# Patient Record
Sex: MALE | Race: WHITE | Employment: PART TIME | URBAN - METROPOLITAN AREA
[De-identification: names, ages, dates, MRNs, and addresses within clinical notes are randomized per-mention and may not be internally consistent; named-entity substitution may affect disease eponyms.]

---

## 2018-09-18 ENCOUNTER — OFFICE VISIT (OUTPATIENT)
Dept: PULMONOLOGY | Age: 63
End: 2018-09-18
Payer: COMMERCIAL

## 2018-09-18 ENCOUNTER — TELEPHONE (OUTPATIENT)
Dept: PULMONOLOGY | Age: 63
End: 2018-09-18

## 2018-09-18 VITALS
BODY MASS INDEX: 28.63 KG/M2 | RESPIRATION RATE: 16 BRPM | DIASTOLIC BLOOD PRESSURE: 81 MMHG | OXYGEN SATURATION: 94 % | HEART RATE: 57 BPM | TEMPERATURE: 99.9 F | WEIGHT: 216 LBS | HEIGHT: 73 IN | SYSTOLIC BLOOD PRESSURE: 142 MMHG

## 2018-09-18 DIAGNOSIS — R05.9 COUGH: ICD-10-CM

## 2018-09-18 PROCEDURE — 99204 OFFICE O/P NEW MOD 45 MIN: CPT | Performed by: INTERNAL MEDICINE

## 2018-09-18 PROCEDURE — 99203 OFFICE O/P NEW LOW 30 MIN: CPT | Performed by: INTERNAL MEDICINE

## 2018-09-18 RX ORDER — MONTELUKAST SODIUM 10 MG/1
10 TABLET, FILM COATED ORAL NIGHTLY
Qty: 30 TABLET | Refills: 3
Start: 2018-09-18 | End: 2019-01-11 | Stop reason: ALTCHOICE

## 2018-09-18 RX ORDER — AZITHROMYCIN 250 MG/1
250 TABLET, FILM COATED ORAL DAILY
Qty: 7 TABLET | Refills: 0 | Status: SHIPPED | OUTPATIENT
Start: 2018-09-18 | End: 2018-09-25

## 2018-09-18 RX ORDER — BUDESONIDE AND FORMOTEROL FUMARATE DIHYDRATE 160; 4.5 UG/1; UG/1
2 AEROSOL RESPIRATORY (INHALATION) 2 TIMES DAILY
Qty: 1 INHALER | Refills: 5 | Status: SHIPPED | OUTPATIENT
Start: 2018-09-18 | End: 2019-05-21 | Stop reason: ALTCHOICE

## 2018-09-18 RX ORDER — PREDNISONE 20 MG/1
TABLET ORAL
Qty: 7 TABLET | Refills: 0 | Status: SHIPPED | OUTPATIENT
Start: 2018-09-18 | End: 2018-09-28

## 2018-09-18 RX ORDER — ALBUTEROL SULFATE 90 UG/1
2 AEROSOL, METERED RESPIRATORY (INHALATION) EVERY 6 HOURS PRN
Qty: 1 INHALER | Refills: 3 | Status: SHIPPED | OUTPATIENT
Start: 2018-09-18 | End: 2019-09-18

## 2018-09-18 NOTE — PROGRESS NOTES
New pt to see Dr. Lynn Verma today; previously seen by other pulmonologist(Dr. Durga Corea). Persistent coughing. Pt to have CXR and labwork completed and follow up in  6 weeks; appt given. Pt to have Full PFT study and office will arrange and contact pt with appointment date and time.
auscultated. Respiratory: scattered rhonchi    Cardiovascular: Regular without murmur, clicks, gallops or rubs. There is no left or right ventricular heave. Pulses:  Carotid, radial and femoral pulses were equally bilaterally. Abdomen: Slightly rounded and soft without organomegaly. No rebound, rigidity or guarding was appreciated. Lymphatic: No lymphadenopathy. Musculoskeletal: no joint pain. Extremities: no edema  Skin:  Warm and dry. Good color, turgor and pigmentation. No lesions or scars. Neurological/Psychiatric: The patient's general behavior, level of consciousness, thought content and emotional status is normal.  Cranial nerves II-XII are intact. DATA:     Ordered    IMPRESSION:    1-reactive Upper airway disease  2-chronic cough   3-Hoarseness   4-Post nasal draine    5- mild GERD           PLAN:      Shift is very pleasant 24-year-old male who has history of 3 years chronic cough that was treated partially with the Qvar and he did well and he relapsed, there is no hemoptysis or retroflex symptoms except his cough has not been better and he came for further evaluation. To proceed with the following workup  -Eosinophilic count   IgE   Full PFT  Respiratory allergy profile   ESR   Send out HPS panel   CXR   Start azithromycin 250 mg Po daily X 7 days  Start prednisolone 30 mg X 5 days \  Symbicort 160/4.5 2 puff bid   Singulair 10 mg     Flu and Pneumovax     Thank you for allowing me to participate in [facility]. I will keep following with you ,should you have any concerns ,please contact me at [phone]     Sincerely,        Cecily Downs MD  Pulmonary & Critical Care Medicine     NOTE: This report was transcribed using voice recognition software. Every effort was made to ensure accuracy; however, inadvertent computerized transcription errors may be present.

## 2018-09-18 NOTE — TELEPHONE ENCOUNTER
Mailed a letter to patient informing him that his PFT is scheduled for 9-28-18 at 8:00 am at the [facility]. She must arrive at 7:30 am. He is not allowed any resp meds for at least four hour prior to test and no caffeine for 24 hours prior to test

## 2018-09-28 ENCOUNTER — HOSPITAL ENCOUNTER (OUTPATIENT)
Age: 63
Discharge: HOME | End: 2018-09-30
Payer: MEDICARE

## 2018-09-28 ENCOUNTER — HOSPITAL ENCOUNTER (OUTPATIENT)
Dept: GENERAL RADIOLOGY | Age: 63
End: 2018-09-28
Payer: MEDICARE

## 2018-09-28 ENCOUNTER — HOSPITAL ENCOUNTER (OUTPATIENT)
Dept: GENERAL RADIOLOGY | Age: 63
Discharge: HOME | End: 2018-09-30
Payer: MEDICARE

## 2018-09-28 ENCOUNTER — HOSPITAL ENCOUNTER (OUTPATIENT)
Dept: PULMONOLOGY | Age: 63
Discharge: HOME | End: 2018-09-28
Payer: MEDICARE

## 2018-09-28 ENCOUNTER — HOSPITAL ENCOUNTER (OUTPATIENT)
Age: 63
Discharge: HOME | End: 2018-09-28
Payer: MEDICARE

## 2018-09-28 DIAGNOSIS — R05.9 COUGH: ICD-10-CM

## 2018-09-28 LAB
EOSINOPHILS ABSOLUTE COUNT: 280 /UL (ref 50–250)
SEDIMENTATION RATE, ERYTHROCYTE: 3 MM/HR (ref 0–15)

## 2018-09-28 PROCEDURE — 85651 RBC SED RATE NONAUTOMATED: CPT

## 2018-09-28 PROCEDURE — 36415 COLL VENOUS BLD VENIPUNCTURE: CPT

## 2018-09-28 PROCEDURE — 94729 DIFFUSING CAPACITY: CPT

## 2018-09-28 PROCEDURE — 94375 RESPIRATORY FLOW VOLUME LOOP: CPT

## 2018-09-28 PROCEDURE — 85048 AUTOMATED LEUKOCYTE COUNT: CPT

## 2018-09-28 PROCEDURE — 94060 EVALUATION OF WHEEZING: CPT

## 2018-09-28 PROCEDURE — 71046 X-RAY EXAM CHEST 2 VIEWS: CPT

## 2018-09-28 PROCEDURE — 94726 PLETHYSMOGRAPHY LUNG VOLUMES: CPT

## 2018-10-02 PROCEDURE — 94726 PLETHYSMOGRAPHY LUNG VOLUMES: CPT | Performed by: INTERNAL MEDICINE

## 2018-10-02 PROCEDURE — 94060 EVALUATION OF WHEEZING: CPT | Performed by: INTERNAL MEDICINE

## 2018-10-02 PROCEDURE — 94729 DIFFUSING CAPACITY: CPT | Performed by: INTERNAL MEDICINE

## 2018-10-03 NOTE — PROCEDURES
[address]                   [address] [facility],  [facility]                                PULMONARY FUNCTION    PATIENT NAME: Ozzie Luther                        :        1955  MED REC NO:   [id number]                            ROOM:  ACCOUNT NO:   [de-identified]                           ADMIT DATE: 2018  PROVIDER:     Henry Ponce MD    DATE OF PROCEDURE:  2018    FULL PFT    STUDIES:  1. Full PFT with bronchodilator response. 2.  Full lung volume. 3.  Diffusion capacity. VITALS:  Height 73, weight 210, age 70. DIAGNOSIS:  Cough with loss of voice. Never smoked. POSTTEST COMMENTS:  The patient gave good efforts. SPIROMETRY:  FVC 3.72, which is 75 of predicted. FEV1 of 2.90, which is 77 of predicted. There is no bronchodilator response. Actually, his parameters went down  with bronchodilator. FEV1/FVC 78, which is 102 of predicted. MVV 66, which is 45 of predicted. LUNG VOLUMES:  SVC 2.91, which is 56 of predicted. ERV 0.47, which is 30% of predicted. RV 2.99, which is 119% of predicted. TLC is 5.90, which is 77% of predicted. RV/TLC is 51, which is 149 of predicted. Diffusion capacity 28.99, which is 79 of predicted, corrects for alveolar  volume to 110. IMPRESSION:  This PFT is showing evidence of restrictive lung disease. Differential diagnosis include always interstitial lung disease,  parenchymal lung disease, obesity, neuromuscular disease. Also and  combined with the lung volumes, there is evidence of air trapping which  could be suggestive of occult COPD along with normal diffusing capacity. The flow volume loop is compatible with also both obstructive and  restrictive, also extrathoracic obstruction cannot be excluded from the  flow volume loop.     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

## 2018-10-30 ENCOUNTER — HOSPITAL ENCOUNTER (OUTPATIENT)
Age: 63
Discharge: HOME | End: 2018-10-30
Payer: MEDICARE

## 2018-10-30 ENCOUNTER — OFFICE VISIT (OUTPATIENT)
Dept: PULMONOLOGY | Age: 63
End: 2018-10-30
Payer: MEDICARE

## 2018-10-30 VITALS
WEIGHT: 211 LBS | HEART RATE: 86 BPM | BODY MASS INDEX: 27.96 KG/M2 | HEIGHT: 73 IN | OXYGEN SATURATION: 95 % | SYSTOLIC BLOOD PRESSURE: 129 MMHG | RESPIRATION RATE: 16 BRPM | TEMPERATURE: 97.8 F | DIASTOLIC BLOOD PRESSURE: 71 MMHG

## 2018-10-30 DIAGNOSIS — J45.909 REACTIVE AIRWAY DISEASE WITHOUT COMPLICATION, UNSPECIFIED ASTHMA SEVERITY, UNSPECIFIED WHETHER PERSISTENT: ICD-10-CM

## 2018-10-30 PROCEDURE — G8484 FLU IMMUNIZE NO ADMIN: HCPCS | Performed by: INTERNAL MEDICINE

## 2018-10-30 PROCEDURE — 1101F PT FALLS ASSESS-DOCD LE1/YR: CPT | Performed by: INTERNAL MEDICINE

## 2018-10-30 PROCEDURE — 82785 ASSAY OF IGE: CPT

## 2018-10-30 PROCEDURE — G8427 DOCREV CUR MEDS BY ELIG CLIN: HCPCS | Performed by: INTERNAL MEDICINE

## 2018-10-30 PROCEDURE — 99214 OFFICE O/P EST MOD 30 MIN: CPT | Performed by: INTERNAL MEDICINE

## 2018-10-30 PROCEDURE — G8419 CALC BMI OUT NRM PARAM NOF/U: HCPCS | Performed by: INTERNAL MEDICINE

## 2018-10-30 PROCEDURE — 4040F PNEUMOC VAC/ADMIN/RCVD: CPT | Performed by: INTERNAL MEDICINE

## 2018-10-30 PROCEDURE — 1036F TOBACCO NON-USER: CPT | Performed by: INTERNAL MEDICINE

## 2018-10-30 PROCEDURE — 3017F COLORECTAL CA SCREEN DOC REV: CPT | Performed by: INTERNAL MEDICINE

## 2018-10-30 PROCEDURE — 86003 ALLG SPEC IGE CRUDE XTRC EA: CPT

## 2018-10-30 PROCEDURE — 99213 OFFICE O/P EST LOW 20 MIN: CPT | Performed by: INTERNAL MEDICINE

## 2018-10-30 PROCEDURE — 1123F ACP DISCUSS/DSCN MKR DOCD: CPT | Performed by: INTERNAL MEDICINE

## 2018-10-30 NOTE — PROGRESS NOTES
6 week follow up. Still c/o phlem/cough. New orders for Symbicort and Spiriva and pt instructed on use of inhalers by RRT Serge. Plan for pt to get Respiratory Allergen Profile lab work completed here today and Dr. Durham Memos to review results when available. Follow up in 3 mos in office; appt given. CT Scan to be done prior to next appt. Advised pt that MA will notify of time and date of scan.
3  Symbicort 160/4.5 2 puff bid   Singulair 10 mg   We'll proceed with high-resolution CAT scan based on the results we will decide if we need any further workup    I would also consider giving him interleukin  5 antagonist,  Flu and Pneumovax as per primary    Thank you for allowing me to participate in [facility]. I will keep following with you ,should you have any concerns ,please contact me at [phone]     Sincerely,        Nestor Ho MD  Pulmonary & Critical Care Medicine     NOTE: This report was transcribed using voice recognition software. Every effort was made to ensure accuracy; however, inadvertent computerized transcription errors may be present.

## 2018-11-02 LAB
Lab: NORMAL
REPORT: NORMAL
THIS TEST SENT TO: NORMAL

## 2018-11-06 ENCOUNTER — TELEPHONE (OUTPATIENT)
Dept: PULMONOLOGY | Age: 63
End: 2018-11-06

## 2018-11-06 NOTE — TELEPHONE ENCOUNTER
Mailed a letter to patient informing him that his CT Chest High Resolution is scheduled for 1-3-19 at 12:30 pm at the [facility]. He must arrive at 12:00 pm. There is no prep for this test

## 2019-01-03 ENCOUNTER — HOSPITAL ENCOUNTER (OUTPATIENT)
Dept: CT IMAGING | Age: 64
Discharge: HOME | End: 2019-01-05
Payer: MEDICARE

## 2019-01-03 DIAGNOSIS — J45.909 REACTIVE AIRWAY DISEASE WITHOUT COMPLICATION, UNSPECIFIED ASTHMA SEVERITY, UNSPECIFIED WHETHER PERSISTENT: ICD-10-CM

## 2019-01-03 PROCEDURE — 71250 CT THORAX DX C-: CPT

## 2019-01-11 ENCOUNTER — OFFICE VISIT (OUTPATIENT)
Dept: PULMONOLOGY | Age: 64
End: 2019-01-11
Payer: MEDICARE

## 2019-01-11 VITALS
WEIGHT: 226 LBS | TEMPERATURE: 98.4 F | DIASTOLIC BLOOD PRESSURE: 69 MMHG | BODY MASS INDEX: 29.95 KG/M2 | SYSTOLIC BLOOD PRESSURE: 122 MMHG | HEART RATE: 81 BPM | RESPIRATION RATE: 20 BRPM | HEIGHT: 73 IN

## 2019-01-11 DIAGNOSIS — J45.991 COUGH VARIANT ASTHMA: Primary | ICD-10-CM

## 2019-01-11 DIAGNOSIS — J45.40 MODERATE PERSISTENT REACTIVE AIRWAY DISEASE WITHOUT COMPLICATION: ICD-10-CM

## 2019-01-11 PROCEDURE — 99213 OFFICE O/P EST LOW 20 MIN: CPT | Performed by: INTERNAL MEDICINE

## 2019-01-11 PROCEDURE — 99214 OFFICE O/P EST MOD 30 MIN: CPT | Performed by: INTERNAL MEDICINE

## 2019-01-16 ENCOUNTER — TELEPHONE (OUTPATIENT)
Dept: PULMONOLOGY | Age: 64
End: 2019-01-16

## 2019-01-16 DIAGNOSIS — J82.83 EOSINOPHILIC ASTHMA: Primary | ICD-10-CM

## 2019-01-16 PROBLEM — J45.909 MODERATE ASTHMA: Status: ACTIVE | Noted: 2019-01-11

## 2019-01-22 RX ORDER — EPINEPHRINE 1 MG/ML
0.3 INJECTION, SOLUTION, CONCENTRATE INTRAVENOUS PRN
Status: CANCELLED | OUTPATIENT
Start: 2019-01-22

## 2019-01-22 RX ORDER — SODIUM CHLORIDE 0.9 % (FLUSH) 0.9 %
10 SYRINGE (ML) INJECTION PRN
Status: CANCELLED | OUTPATIENT
Start: 2019-01-22

## 2019-01-22 RX ORDER — METHYLPREDNISOLONE SODIUM SUCCINATE 125 MG/2ML
125 INJECTION, POWDER, LYOPHILIZED, FOR SOLUTION INTRAMUSCULAR; INTRAVENOUS ONCE
Status: CANCELLED | OUTPATIENT
Start: 2019-01-22 | End: 2019-01-22

## 2019-01-22 RX ORDER — 0.9 % SODIUM CHLORIDE 0.9 %
10 VIAL (ML) INJECTION ONCE
Status: CANCELLED | OUTPATIENT
Start: 2019-01-22 | End: 2019-01-22

## 2019-01-22 RX ORDER — DIPHENHYDRAMINE HYDROCHLORIDE 50 MG/ML
50 INJECTION INTRAMUSCULAR; INTRAVENOUS ONCE
Status: CANCELLED | OUTPATIENT
Start: 2019-01-22 | End: 2019-01-22

## 2019-01-22 RX ORDER — SODIUM CHLORIDE 9 MG/ML
INJECTION, SOLUTION INTRAVENOUS CONTINUOUS
Status: CANCELLED | OUTPATIENT
Start: 2019-01-22

## 2019-01-22 RX ORDER — SODIUM CHLORIDE 0.9 % (FLUSH) 0.9 %
5 SYRINGE (ML) INJECTION PRN
Status: CANCELLED | OUTPATIENT
Start: 2019-01-22

## 2019-01-22 RX ORDER — ACETAMINOPHEN 325 MG/1
650 TABLET ORAL ONCE
Status: CANCELLED | OUTPATIENT
Start: 2019-01-22

## 2019-01-22 RX ORDER — HEPARIN SODIUM (PORCINE) LOCK FLUSH IV SOLN 100 UNIT/ML 100 UNIT/ML
500 SOLUTION INTRAVENOUS PRN
Status: CANCELLED | OUTPATIENT
Start: 2019-01-22

## 2019-02-04 DIAGNOSIS — J45.40 MODERATE PERSISTENT ASTHMA, UNSPECIFIED WHETHER COMPLICATED: Primary | ICD-10-CM

## 2019-02-04 RX ORDER — EPINEPHRINE 0.3 MG/.3ML
INJECTION SUBCUTANEOUS
Qty: 0.3 ML | Refills: 0 | Status: SHIPPED | OUTPATIENT
Start: 2019-02-04 | End: 2019-07-01 | Stop reason: SDUPTHER

## 2019-02-09 ENCOUNTER — PATIENT MESSAGE (OUTPATIENT)
Dept: PULMONOLOGY | Age: 64
End: 2019-02-09

## 2019-02-12 ENCOUNTER — HOSPITAL ENCOUNTER (OUTPATIENT)
Dept: INFUSION THERAPY | Age: 64
Setting detail: INFUSION SERIES
Discharge: HOME | End: 2019-02-12
Payer: MEDICARE

## 2019-02-12 VITALS
RESPIRATION RATE: 18 BRPM | SYSTOLIC BLOOD PRESSURE: 125 MMHG | OXYGEN SATURATION: 95 % | TEMPERATURE: 97.7 F | HEART RATE: 52 BPM | DIASTOLIC BLOOD PRESSURE: 72 MMHG

## 2019-02-12 DIAGNOSIS — J82.83 EOSINOPHILIC ASTHMA: ICD-10-CM

## 2019-02-12 PROCEDURE — 96372 THER/PROPH/DIAG INJ SC/IM: CPT

## 2019-02-12 PROCEDURE — 6370000000 HC RX 637 (ALT 250 FOR IP): Performed by: INTERNAL MEDICINE

## 2019-02-12 PROCEDURE — 2580000003 HC RX 258: Performed by: INTERNAL MEDICINE

## 2019-02-12 PROCEDURE — 6360000002 HC RX W HCPCS: Performed by: INTERNAL MEDICINE

## 2019-02-12 RX ORDER — ACETAMINOPHEN 325 MG/1
650 TABLET ORAL ONCE
Status: COMPLETED | OUTPATIENT
Start: 2019-02-12 | End: 2019-02-12

## 2019-02-12 RX ORDER — SODIUM CHLORIDE 9 MG/ML
INJECTION, SOLUTION INTRAVENOUS CONTINUOUS
Status: CANCELLED | OUTPATIENT
Start: 2019-02-12

## 2019-02-12 RX ORDER — SODIUM CHLORIDE 0.9 % (FLUSH) 0.9 %
10 SYRINGE (ML) INJECTION PRN
Status: CANCELLED | OUTPATIENT
Start: 2019-02-12

## 2019-02-12 RX ORDER — METHYLPREDNISOLONE SODIUM SUCCINATE 125 MG/2ML
125 INJECTION, POWDER, LYOPHILIZED, FOR SOLUTION INTRAMUSCULAR; INTRAVENOUS ONCE
Status: CANCELLED | OUTPATIENT
Start: 2019-02-12 | End: 2019-02-12

## 2019-02-12 RX ORDER — HEPARIN SODIUM (PORCINE) LOCK FLUSH IV SOLN 100 UNIT/ML 100 UNIT/ML
500 SOLUTION INTRAVENOUS PRN
Status: CANCELLED | OUTPATIENT
Start: 2019-02-12

## 2019-02-12 RX ORDER — EPINEPHRINE 1 MG/ML
0.3 INJECTION, SOLUTION, CONCENTRATE INTRAVENOUS PRN
Status: CANCELLED | OUTPATIENT
Start: 2019-02-12

## 2019-02-12 RX ORDER — ACETAMINOPHEN 325 MG/1
650 TABLET ORAL ONCE
Status: CANCELLED | OUTPATIENT
Start: 2019-02-12

## 2019-02-12 RX ORDER — DIPHENHYDRAMINE HYDROCHLORIDE 50 MG/ML
50 INJECTION INTRAMUSCULAR; INTRAVENOUS ONCE
Status: CANCELLED | OUTPATIENT
Start: 2019-02-12 | End: 2019-02-12

## 2019-02-12 RX ORDER — SODIUM CHLORIDE 0.9 % (FLUSH) 0.9 %
5 SYRINGE (ML) INJECTION PRN
Status: CANCELLED | OUTPATIENT
Start: 2019-02-12

## 2019-02-12 RX ORDER — 0.9 % SODIUM CHLORIDE 0.9 %
10 VIAL (ML) INJECTION ONCE
Status: CANCELLED | OUTPATIENT
Start: 2019-02-12 | End: 2019-02-12

## 2019-02-12 RX ADMIN — ACETAMINOPHEN 650 MG: 325 TABLET, FILM COATED ORAL at 09:35

## 2019-02-12 RX ADMIN — WATER 225 MG: 1 INJECTION INTRAMUSCULAR; INTRAVENOUS; SUBCUTANEOUS at 09:40

## 2019-02-26 ENCOUNTER — HOSPITAL ENCOUNTER (OUTPATIENT)
Dept: INFUSION THERAPY | Age: 64
Setting detail: INFUSION SERIES
Discharge: HOME | End: 2019-02-26
Payer: MEDICARE

## 2019-02-26 VITALS
RESPIRATION RATE: 18 BRPM | HEART RATE: 64 BPM | DIASTOLIC BLOOD PRESSURE: 84 MMHG | OXYGEN SATURATION: 96 % | TEMPERATURE: 97.4 F | SYSTOLIC BLOOD PRESSURE: 124 MMHG

## 2019-02-26 DIAGNOSIS — J82.83 EOSINOPHILIC ASTHMA: ICD-10-CM

## 2019-02-26 PROCEDURE — 96372 THER/PROPH/DIAG INJ SC/IM: CPT

## 2019-02-26 PROCEDURE — 6360000002 HC RX W HCPCS: Performed by: INTERNAL MEDICINE

## 2019-02-26 PROCEDURE — 2580000003 HC RX 258: Performed by: INTERNAL MEDICINE

## 2019-02-26 PROCEDURE — 6370000000 HC RX 637 (ALT 250 FOR IP): Performed by: INTERNAL MEDICINE

## 2019-02-26 RX ORDER — DIPHENHYDRAMINE HYDROCHLORIDE 50 MG/ML
50 INJECTION INTRAMUSCULAR; INTRAVENOUS ONCE
Status: CANCELLED | OUTPATIENT
Start: 2019-02-26 | End: 2019-02-26

## 2019-02-26 RX ORDER — METHYLPREDNISOLONE SODIUM SUCCINATE 125 MG/2ML
125 INJECTION, POWDER, LYOPHILIZED, FOR SOLUTION INTRAMUSCULAR; INTRAVENOUS ONCE
Status: CANCELLED | OUTPATIENT
Start: 2019-02-26 | End: 2019-02-26

## 2019-02-26 RX ORDER — ACETAMINOPHEN 325 MG/1
650 TABLET ORAL ONCE
Status: COMPLETED | OUTPATIENT
Start: 2019-02-26 | End: 2019-02-26

## 2019-02-26 RX ORDER — EPINEPHRINE 1 MG/ML
0.3 INJECTION, SOLUTION, CONCENTRATE INTRAVENOUS PRN
Status: CANCELLED | OUTPATIENT
Start: 2019-02-26

## 2019-02-26 RX ORDER — SODIUM CHLORIDE 0.9 % (FLUSH) 0.9 %
10 SYRINGE (ML) INJECTION PRN
Status: CANCELLED | OUTPATIENT
Start: 2019-02-26

## 2019-02-26 RX ORDER — SODIUM CHLORIDE 0.9 % (FLUSH) 0.9 %
5 SYRINGE (ML) INJECTION PRN
Status: CANCELLED | OUTPATIENT
Start: 2019-02-26

## 2019-02-26 RX ORDER — HEPARIN SODIUM (PORCINE) LOCK FLUSH IV SOLN 100 UNIT/ML 100 UNIT/ML
500 SOLUTION INTRAVENOUS PRN
Status: CANCELLED | OUTPATIENT
Start: 2019-02-26

## 2019-02-26 RX ORDER — SODIUM CHLORIDE 9 MG/ML
INJECTION, SOLUTION INTRAVENOUS CONTINUOUS
Status: CANCELLED | OUTPATIENT
Start: 2019-02-26

## 2019-02-26 RX ORDER — 0.9 % SODIUM CHLORIDE 0.9 %
10 VIAL (ML) INJECTION ONCE
Status: CANCELLED | OUTPATIENT
Start: 2019-02-26 | End: 2019-02-26

## 2019-02-26 RX ORDER — ACETAMINOPHEN 325 MG/1
650 TABLET ORAL ONCE
Status: CANCELLED | OUTPATIENT
Start: 2019-02-26

## 2019-02-26 RX ADMIN — WATER 225 MG: 1 INJECTION INTRAMUSCULAR; INTRAVENOUS; SUBCUTANEOUS at 09:27

## 2019-02-26 RX ADMIN — ACETAMINOPHEN 650 MG: 325 TABLET, FILM COATED ORAL at 09:01

## 2019-02-26 NOTE — PROGRESS NOTES
[address] [place] [address] Allergy Control Test    Prescribing Physician: Dc Ambrocioor    Was patient administered Xolair on appointed administration date? [x] yes [] no    Reason for not administering Xolair :[] Illness [] No show [] Other:    Was there any reaction to mediation? [] yes [] no    If Yes: Reactions:      1. In the past 4 weeks, how much of the time did your asthma keep you from getting as much done as usual at work, school or at home? [] 1 [] 2 [] 3 [] 4 [x] 5   Score:__5____    2. During the past 4 weeks, how often have you had shortness of breath? [] 1 [] 2 [] 3 [] 4 [x] 5   Score:__5____    3. During the past four weeks, how often did your asthma symptoms (wheezing, coughing, shortness of breath, chest tightness, or pain) wake you up at night or earlier than usual in the morning? [] 1 [x] 2 [] 3 [] 4 [] 5   Score:___2___    4. During the past four weeks, how often have you used your rescue inhaler or nebulizer medication? [x] 1 [] 2 [] 3 [] 4 [] 5   Score:__1____    5. How would you rate your asthma control during the past 4 weeks? [] 1 [] 2 [x] 3 [] 4 [] 5   Score:__3____        Total Score:_______16_________        To score the Asthma control test: Each response to the 5 ACT questions has a point value from 1-5 as shown on the form. To score the ACT, add up the point value for each response to the 5 questions.

## 2019-03-12 ENCOUNTER — HOSPITAL ENCOUNTER (OUTPATIENT)
Dept: INFUSION THERAPY | Age: 64
Setting detail: INFUSION SERIES
Discharge: HOME | End: 2019-03-12
Payer: MEDICARE

## 2019-03-12 ENCOUNTER — TELEPHONE (OUTPATIENT)
Dept: PULMONOLOGY | Age: 64
End: 2019-03-12

## 2019-03-12 VITALS
HEART RATE: 68 BPM | SYSTOLIC BLOOD PRESSURE: 129 MMHG | TEMPERATURE: 98.1 F | RESPIRATION RATE: 16 BRPM | OXYGEN SATURATION: 97 % | DIASTOLIC BLOOD PRESSURE: 81 MMHG

## 2019-03-12 DIAGNOSIS — J82.83 EOSINOPHILIC ASTHMA: Primary | ICD-10-CM

## 2019-03-12 PROCEDURE — 6360000002 HC RX W HCPCS: Performed by: INTERNAL MEDICINE

## 2019-03-12 PROCEDURE — 6370000000 HC RX 637 (ALT 250 FOR IP): Performed by: INTERNAL MEDICINE

## 2019-03-12 PROCEDURE — 2580000003 HC RX 258: Performed by: INTERNAL MEDICINE

## 2019-03-12 PROCEDURE — 96372 THER/PROPH/DIAG INJ SC/IM: CPT

## 2019-03-12 RX ORDER — ACETAMINOPHEN 325 MG/1
650 TABLET ORAL ONCE
Status: COMPLETED | OUTPATIENT
Start: 2019-03-12 | End: 2019-03-12

## 2019-03-12 RX ORDER — ACETAMINOPHEN 325 MG/1
650 TABLET ORAL ONCE
Status: CANCELLED | OUTPATIENT
Start: 2019-03-12

## 2019-03-12 RX ORDER — SODIUM CHLORIDE 0.9 % (FLUSH) 0.9 %
10 SYRINGE (ML) INJECTION PRN
Status: CANCELLED | OUTPATIENT
Start: 2019-03-12

## 2019-03-12 RX ORDER — EPINEPHRINE 1 MG/ML
0.3 INJECTION, SOLUTION, CONCENTRATE INTRAVENOUS PRN
Status: CANCELLED | OUTPATIENT
Start: 2019-03-12

## 2019-03-12 RX ORDER — DIPHENHYDRAMINE HYDROCHLORIDE 50 MG/ML
50 INJECTION INTRAMUSCULAR; INTRAVENOUS ONCE
Status: CANCELLED | OUTPATIENT
Start: 2019-03-12 | End: 2019-03-12

## 2019-03-12 RX ORDER — SODIUM CHLORIDE 0.9 % (FLUSH) 0.9 %
5 SYRINGE (ML) INJECTION PRN
Status: CANCELLED | OUTPATIENT
Start: 2019-03-12

## 2019-03-12 RX ORDER — 0.9 % SODIUM CHLORIDE 0.9 %
10 VIAL (ML) INJECTION ONCE
Status: CANCELLED | OUTPATIENT
Start: 2019-03-12 | End: 2019-03-12

## 2019-03-12 RX ORDER — HEPARIN SODIUM (PORCINE) LOCK FLUSH IV SOLN 100 UNIT/ML 100 UNIT/ML
500 SOLUTION INTRAVENOUS PRN
Status: CANCELLED | OUTPATIENT
Start: 2019-03-12

## 2019-03-12 RX ORDER — SODIUM CHLORIDE 9 MG/ML
INJECTION, SOLUTION INTRAVENOUS CONTINUOUS
Status: CANCELLED | OUTPATIENT
Start: 2019-03-12

## 2019-03-12 RX ORDER — METHYLPREDNISOLONE SODIUM SUCCINATE 125 MG/2ML
125 INJECTION, POWDER, LYOPHILIZED, FOR SOLUTION INTRAMUSCULAR; INTRAVENOUS ONCE
Status: CANCELLED | OUTPATIENT
Start: 2019-03-12 | End: 2019-03-12

## 2019-03-12 RX ADMIN — WATER 225 MG: 1 INJECTION INTRAMUSCULAR; INTRAVENOUS; SUBCUTANEOUS at 10:06

## 2019-03-12 RX ADMIN — ACETAMINOPHEN 650 MG: 325 TABLET, FILM COATED ORAL at 10:00

## 2019-03-12 NOTE — PROGRESS NOTES
Patient unable to stay for the full 2 hour observation. Only able to stay for 90 minutes. All vital signs are good and oxygen saturation is also good. Patient declined discharge instructions. Patient discharged in stable condition.

## 2019-03-12 NOTE — PROGRESS NOTES
[facility] Xolair Allergy Control Test    Prescribing Physician:    Was patient administered Xolair on appointed administration date? [x] yes [] no    Reason for not administering Xolair :[] Illness [] No show [] Other:    Was there any reaction to mediation? [] yes [x] no    If Yes: Reactions:      1. In the past 4 weeks, how much of the time did your asthma keep you from getting as much done as usual at work, school or at home? [] 1 [] 2 [] 3 [] 4 [x] 5   Score:___5___    2. During the past 4 weeks, how often have you had shortness of breath? [] 1 [] 2 [] 3 [] 4 [x] 5   Score:____5__    3. During the past four weeks, how often did your asthma symptoms (wheezing, coughing, shortness of breath, chest tightness, or pain) wake you up at night or earlier than usual in the morning? [] 1 [x] 2 [] 3 [] 4 [] 5   Score:___2___    4. During the past four weeks, how often have you used your rescue inhaler or nebulizer medication? [] 1 [] 2 [] 3 [] 4 [x] 5   Score:__5____    5. How would you rate your asthma control during the past 4 weeks? [] 1 [] 2 [x] 3 [] 4 [] 5   Score:___3___        Total Score:_______20_________        To score the Asthma control test: Each response to the 5 ACT questions has a point value from 1-5 as shown on the form. To score the ACT, add up the point value for each response to the 5 questions.

## 2019-03-15 ENCOUNTER — TELEPHONE (OUTPATIENT)
Dept: PULMONOLOGY | Age: 64
End: 2019-03-15

## 2019-03-26 ENCOUNTER — OFFICE VISIT (OUTPATIENT)
Dept: PULMONOLOGY | Age: 64
End: 2019-03-26
Payer: MEDICARE

## 2019-03-26 ENCOUNTER — HOSPITAL ENCOUNTER (OUTPATIENT)
Dept: INFUSION THERAPY | Age: 64
Setting detail: INFUSION SERIES
Discharge: HOME | End: 2019-03-26
Payer: MEDICARE

## 2019-03-26 VITALS
SYSTOLIC BLOOD PRESSURE: 137 MMHG | OXYGEN SATURATION: 96 % | DIASTOLIC BLOOD PRESSURE: 81 MMHG | HEIGHT: 73 IN | WEIGHT: 224 LBS | BODY MASS INDEX: 29.69 KG/M2 | HEART RATE: 54 BPM | RESPIRATION RATE: 18 BRPM

## 2019-03-26 VITALS
DIASTOLIC BLOOD PRESSURE: 69 MMHG | RESPIRATION RATE: 18 BRPM | OXYGEN SATURATION: 99 % | SYSTOLIC BLOOD PRESSURE: 121 MMHG | TEMPERATURE: 97.3 F | HEART RATE: 59 BPM

## 2019-03-26 DIAGNOSIS — J45.40 MODERATE PERSISTENT ASTHMA, UNSPECIFIED WHETHER COMPLICATED: ICD-10-CM

## 2019-03-26 DIAGNOSIS — J82.83 EOSINOPHILIC ASTHMA: Primary | ICD-10-CM

## 2019-03-26 DIAGNOSIS — J82.83 EOSINOPHILIC ASTHMA: ICD-10-CM

## 2019-03-26 PROCEDURE — 96372 THER/PROPH/DIAG INJ SC/IM: CPT

## 2019-03-26 PROCEDURE — 6370000000 HC RX 637 (ALT 250 FOR IP): Performed by: INTERNAL MEDICINE

## 2019-03-26 PROCEDURE — 99213 OFFICE O/P EST LOW 20 MIN: CPT | Performed by: INTERNAL MEDICINE

## 2019-03-26 PROCEDURE — 99214 OFFICE O/P EST MOD 30 MIN: CPT | Performed by: INTERNAL MEDICINE

## 2019-03-26 PROCEDURE — 2580000003 HC RX 258: Performed by: INTERNAL MEDICINE

## 2019-03-26 PROCEDURE — 6360000002 HC RX W HCPCS: Performed by: INTERNAL MEDICINE

## 2019-03-26 RX ORDER — ACETAMINOPHEN 325 MG/1
650 TABLET ORAL ONCE
Status: COMPLETED | OUTPATIENT
Start: 2019-03-26 | End: 2019-03-26

## 2019-03-26 RX ORDER — SODIUM CHLORIDE 0.9 % (FLUSH) 0.9 %
5 SYRINGE (ML) INJECTION PRN
Status: CANCELLED | OUTPATIENT
Start: 2019-03-26

## 2019-03-26 RX ORDER — DIPHENHYDRAMINE HYDROCHLORIDE 50 MG/ML
50 INJECTION INTRAMUSCULAR; INTRAVENOUS ONCE
Status: CANCELLED | OUTPATIENT
Start: 2019-03-26 | End: 2019-03-26

## 2019-03-26 RX ORDER — HEPARIN SODIUM (PORCINE) LOCK FLUSH IV SOLN 100 UNIT/ML 100 UNIT/ML
500 SOLUTION INTRAVENOUS PRN
Status: CANCELLED | OUTPATIENT
Start: 2019-03-26

## 2019-03-26 RX ORDER — SODIUM CHLORIDE 0.9 % (FLUSH) 0.9 %
10 SYRINGE (ML) INJECTION PRN
Status: CANCELLED | OUTPATIENT
Start: 2019-03-26

## 2019-03-26 RX ORDER — SODIUM CHLORIDE 9 MG/ML
INJECTION, SOLUTION INTRAVENOUS CONTINUOUS
Status: CANCELLED | OUTPATIENT
Start: 2019-03-26

## 2019-03-26 RX ORDER — BUDESONIDE AND FORMOTEROL FUMARATE DIHYDRATE 160; 4.5 UG/1; UG/1
2 AEROSOL RESPIRATORY (INHALATION) 2 TIMES DAILY
Qty: 1 INHALER | Refills: 5 | Status: SHIPPED | OUTPATIENT
Start: 2019-03-26 | End: 2019-04-09 | Stop reason: SINTOL

## 2019-03-26 RX ORDER — ACETAMINOPHEN 325 MG/1
650 TABLET ORAL ONCE
Status: CANCELLED | OUTPATIENT
Start: 2019-03-26

## 2019-03-26 RX ORDER — METHYLPREDNISOLONE SODIUM SUCCINATE 125 MG/2ML
125 INJECTION, POWDER, LYOPHILIZED, FOR SOLUTION INTRAMUSCULAR; INTRAVENOUS ONCE
Status: CANCELLED | OUTPATIENT
Start: 2019-03-26 | End: 2019-03-26

## 2019-03-26 RX ORDER — 0.9 % SODIUM CHLORIDE 0.9 %
10 VIAL (ML) INJECTION ONCE
Status: CANCELLED | OUTPATIENT
Start: 2019-03-26 | End: 2019-03-26

## 2019-03-26 RX ORDER — EPINEPHRINE 1 MG/ML
0.3 INJECTION, SOLUTION, CONCENTRATE INTRAVENOUS PRN
Status: CANCELLED | OUTPATIENT
Start: 2019-03-26

## 2019-03-26 RX ADMIN — ACETAMINOPHEN 650 MG: 325 TABLET, FILM COATED ORAL at 13:44

## 2019-03-26 RX ADMIN — WATER 225 MG: 1 INJECTION INTRAMUSCULAR; INTRAVENOUS; SUBCUTANEOUS at 13:49

## 2019-03-26 NOTE — PROGRESS NOTES
[facility] Xolair Allergy Control Test    Prescribing Physician: Brandin Moreno    Was patient administered Xolair on appointed administration date? [x] yes [] no    Reason for not administering Xolair :[] Illness [] No show [] Other:    Was there any reaction to mediation? [] yes [x] no    If Yes: Reactions:      1. In the past 4 weeks, how much of the time did your asthma keep you from getting as much done as usual at work, school or at home? [] 1 [] 2 [] 3 [] 4 [x] 5   Score:_5_____    2. During the past 4 weeks, how often have you had shortness of breath? [] 1 [] 2 [] 3 [] 4 [x] 5   Score:__5____    3. During the past four weeks, how often did your asthma symptoms (wheezing, coughing, shortness of breath, chest tightness, or pain) wake you up at night or earlier than usual in the morning? [] 1 [x] 2 [] 3 [] 4 [] 5   Score:__2____    4. During the past four weeks, how often have you used your rescue inhaler or nebulizer medication? [] 1 [] 2 [] 3 [] 4 [x] 5   Score:__5____    5. How would you rate your asthma control during the past 4 weeks? [] 1 [] 2 [x] 3 [] 4 [] 5   Score:__3____        Total Score:______20__________        To score the Asthma control test: Each response to the 5 ACT questions has a point value from 1-5 as shown on the form. To score the ACT, add up the point value for each response to the 5 questions.

## 2019-03-26 NOTE — PROGRESS NOTES
Patient came in for 4th xolair injection. Patient states he has an appointment with his pulmonologist Dr. Hailey Abbott at 2:30 and cannot stay full hour after injection. Attempted to reach Dr. Jaya Alonso's office to see if it was okay for patient to finish last half hour with them, but was unable to get ahold of anyone. Patient states that he did not want to miss his appointment so he was discharged and walked to Dr. Jaya Alonso's office down the pineda. Patient discharged in stable condition.

## 2019-04-09 ENCOUNTER — TELEPHONE (OUTPATIENT)
Dept: PULMONOLOGY | Age: 64
End: 2019-04-09

## 2019-04-09 ENCOUNTER — HOSPITAL ENCOUNTER (OUTPATIENT)
Dept: INFUSION THERAPY | Age: 64
Setting detail: INFUSION SERIES
Discharge: HOME | End: 2019-04-09
Payer: MEDICARE

## 2019-04-09 VITALS
DIASTOLIC BLOOD PRESSURE: 70 MMHG | OXYGEN SATURATION: 97 % | SYSTOLIC BLOOD PRESSURE: 119 MMHG | RESPIRATION RATE: 18 BRPM | TEMPERATURE: 97.6 F | HEART RATE: 57 BPM

## 2019-04-09 DIAGNOSIS — J45.909 REACTIVE AIRWAY DISEASE WITHOUT COMPLICATION, UNSPECIFIED ASTHMA SEVERITY, UNSPECIFIED WHETHER PERSISTENT: Primary | ICD-10-CM

## 2019-04-09 DIAGNOSIS — J82.83 EOSINOPHILIC ASTHMA: Primary | ICD-10-CM

## 2019-04-09 PROCEDURE — 6370000000 HC RX 637 (ALT 250 FOR IP): Performed by: INTERNAL MEDICINE

## 2019-04-09 PROCEDURE — 2580000003 HC RX 258: Performed by: INTERNAL MEDICINE

## 2019-04-09 PROCEDURE — 6360000002 HC RX W HCPCS: Performed by: INTERNAL MEDICINE

## 2019-04-09 PROCEDURE — 96372 THER/PROPH/DIAG INJ SC/IM: CPT

## 2019-04-09 RX ORDER — SODIUM CHLORIDE 0.9 % (FLUSH) 0.9 %
10 SYRINGE (ML) INJECTION PRN
Status: CANCELLED | OUTPATIENT
Start: 2019-04-23

## 2019-04-09 RX ORDER — FLUTICASONE FUROATE AND VILANTEROL 100; 25 UG/1; UG/1
1 POWDER RESPIRATORY (INHALATION) DAILY
Qty: 1 EACH | Refills: 0 | COMMUNITY
Start: 2019-04-09 | End: 2019-04-22 | Stop reason: SDUPTHER

## 2019-04-09 RX ORDER — METHYLPREDNISOLONE SODIUM SUCCINATE 125 MG/2ML
125 INJECTION, POWDER, LYOPHILIZED, FOR SOLUTION INTRAMUSCULAR; INTRAVENOUS ONCE
Status: CANCELLED | OUTPATIENT
Start: 2019-04-23

## 2019-04-09 RX ORDER — SODIUM CHLORIDE 0.9 % (FLUSH) 0.9 %
5 SYRINGE (ML) INJECTION PRN
Status: CANCELLED | OUTPATIENT
Start: 2019-04-23

## 2019-04-09 RX ORDER — SODIUM CHLORIDE 9 MG/ML
INJECTION, SOLUTION INTRAVENOUS CONTINUOUS
Status: CANCELLED | OUTPATIENT
Start: 2019-04-23

## 2019-04-09 RX ORDER — DIPHENHYDRAMINE HYDROCHLORIDE 50 MG/ML
50 INJECTION INTRAMUSCULAR; INTRAVENOUS ONCE
Status: CANCELLED | OUTPATIENT
Start: 2019-04-23

## 2019-04-09 RX ORDER — ACETAMINOPHEN 325 MG/1
650 TABLET ORAL ONCE
Status: CANCELLED | OUTPATIENT
Start: 2019-04-23

## 2019-04-09 RX ORDER — 0.9 % SODIUM CHLORIDE 0.9 %
10 VIAL (ML) INJECTION ONCE
Status: CANCELLED | OUTPATIENT
Start: 2019-04-23

## 2019-04-09 RX ORDER — HEPARIN SODIUM (PORCINE) LOCK FLUSH IV SOLN 100 UNIT/ML 100 UNIT/ML
500 SOLUTION INTRAVENOUS PRN
Status: CANCELLED | OUTPATIENT
Start: 2019-04-23

## 2019-04-09 RX ORDER — ACETAMINOPHEN 325 MG/1
650 TABLET ORAL ONCE
Status: COMPLETED | OUTPATIENT
Start: 2019-04-09 | End: 2019-04-09

## 2019-04-09 RX ORDER — EPINEPHRINE 1 MG/ML
0.3 INJECTION, SOLUTION, CONCENTRATE INTRAVENOUS PRN
Status: CANCELLED | OUTPATIENT
Start: 2019-04-23

## 2019-04-09 RX ADMIN — WATER 225 MG: 1 INJECTION INTRAMUSCULAR; INTRAVENOUS; SUBCUTANEOUS at 09:33

## 2019-04-09 RX ADMIN — ACETAMINOPHEN 650 MG: 325 TABLET, FILM COATED ORAL at 09:21

## 2019-04-09 NOTE — TELEPHONE ENCOUNTER
Patient stopped in office today with complaints of hoarseness and increased congestion in chest. Formulary change to Breo 100 mcg inhaler. Samples for 1 month given to pt and pt to call RN to update on symptoms after 1 week of use. Pt to call next week and update Dr. Alannah García on effectiveness of new med.

## 2019-04-22 DIAGNOSIS — J45.909 REACTIVE AIRWAY DISEASE WITHOUT COMPLICATION, UNSPECIFIED ASTHMA SEVERITY, UNSPECIFIED WHETHER PERSISTENT: ICD-10-CM

## 2019-04-22 RX ORDER — FLUTICASONE FUROATE AND VILANTEROL 100; 25 UG/1; UG/1
1 POWDER RESPIRATORY (INHALATION) DAILY
Qty: 1 EACH | Refills: 5 | Status: SHIPPED | OUTPATIENT
Start: 2019-04-22 | End: 2019-05-21 | Stop reason: SDUPTHER

## 2019-04-22 RX ORDER — FLUTICASONE FUROATE AND VILANTEROL 100; 25 UG/1; UG/1
1 POWDER RESPIRATORY (INHALATION) DAILY
Qty: 1 EACH | Refills: 0 | Status: SHIPPED | OUTPATIENT
Start: 2019-04-22 | End: 2019-04-22 | Stop reason: SDUPTHER

## 2019-04-23 ENCOUNTER — HOSPITAL ENCOUNTER (OUTPATIENT)
Dept: INFUSION THERAPY | Age: 64
Setting detail: INFUSION SERIES
Discharge: HOME | End: 2019-04-23
Payer: MEDICARE

## 2019-04-23 VITALS
HEART RATE: 73 BPM | OXYGEN SATURATION: 96 % | DIASTOLIC BLOOD PRESSURE: 62 MMHG | SYSTOLIC BLOOD PRESSURE: 114 MMHG | RESPIRATION RATE: 16 BRPM | TEMPERATURE: 96.6 F

## 2019-04-23 DIAGNOSIS — J82.83 EOSINOPHILIC ASTHMA: Primary | ICD-10-CM

## 2019-04-23 PROCEDURE — 2580000003 HC RX 258: Performed by: INTERNAL MEDICINE

## 2019-04-23 PROCEDURE — 6370000000 HC RX 637 (ALT 250 FOR IP): Performed by: INTERNAL MEDICINE

## 2019-04-23 PROCEDURE — 96372 THER/PROPH/DIAG INJ SC/IM: CPT

## 2019-04-23 PROCEDURE — 6360000002 HC RX W HCPCS: Performed by: INTERNAL MEDICINE

## 2019-04-23 RX ORDER — SODIUM CHLORIDE 0.9 % (FLUSH) 0.9 %
5 SYRINGE (ML) INJECTION PRN
Status: CANCELLED | OUTPATIENT
Start: 2019-05-07

## 2019-04-23 RX ORDER — SODIUM CHLORIDE 0.9 % (FLUSH) 0.9 %
10 SYRINGE (ML) INJECTION PRN
Status: CANCELLED | OUTPATIENT
Start: 2019-05-07

## 2019-04-23 RX ORDER — ACETAMINOPHEN 325 MG/1
650 TABLET ORAL ONCE
Status: COMPLETED | OUTPATIENT
Start: 2019-04-23 | End: 2019-04-23

## 2019-04-23 RX ORDER — 0.9 % SODIUM CHLORIDE 0.9 %
10 VIAL (ML) INJECTION ONCE
Status: CANCELLED | OUTPATIENT
Start: 2019-05-07

## 2019-04-23 RX ORDER — SODIUM CHLORIDE 9 MG/ML
INJECTION, SOLUTION INTRAVENOUS CONTINUOUS
Status: CANCELLED | OUTPATIENT
Start: 2019-05-07

## 2019-04-23 RX ORDER — DIPHENHYDRAMINE HYDROCHLORIDE 50 MG/ML
50 INJECTION INTRAMUSCULAR; INTRAVENOUS ONCE
Status: CANCELLED | OUTPATIENT
Start: 2019-05-07

## 2019-04-23 RX ORDER — ACETAMINOPHEN 325 MG/1
650 TABLET ORAL ONCE
Status: CANCELLED | OUTPATIENT
Start: 2019-05-07

## 2019-04-23 RX ORDER — HEPARIN SODIUM (PORCINE) LOCK FLUSH IV SOLN 100 UNIT/ML 100 UNIT/ML
500 SOLUTION INTRAVENOUS PRN
Status: CANCELLED | OUTPATIENT
Start: 2019-05-07

## 2019-04-23 RX ORDER — EPINEPHRINE 1 MG/ML
0.3 INJECTION, SOLUTION, CONCENTRATE INTRAVENOUS PRN
Status: CANCELLED | OUTPATIENT
Start: 2019-05-07

## 2019-04-23 RX ORDER — METHYLPREDNISOLONE SODIUM SUCCINATE 125 MG/2ML
125 INJECTION, POWDER, LYOPHILIZED, FOR SOLUTION INTRAMUSCULAR; INTRAVENOUS ONCE
Status: CANCELLED | OUTPATIENT
Start: 2019-05-07

## 2019-04-23 RX ADMIN — WATER 225 MG: 1 INJECTION INTRAMUSCULAR; INTRAVENOUS; SUBCUTANEOUS at 09:18

## 2019-04-23 RX ADMIN — ACETAMINOPHEN 650 MG: 325 TABLET, FILM COATED ORAL at 09:11

## 2019-04-23 ASSESSMENT — PAIN SCALES - GENERAL: PAINLEVEL_OUTOF10: 0

## 2019-05-07 ENCOUNTER — HOSPITAL ENCOUNTER (OUTPATIENT)
Dept: INFUSION THERAPY | Age: 64
Setting detail: INFUSION SERIES
Discharge: HOME | End: 2019-05-07
Payer: MEDICARE

## 2019-05-07 VITALS
HEART RATE: 62 BPM | TEMPERATURE: 97.5 F | DIASTOLIC BLOOD PRESSURE: 59 MMHG | OXYGEN SATURATION: 95 % | RESPIRATION RATE: 18 BRPM | SYSTOLIC BLOOD PRESSURE: 106 MMHG

## 2019-05-07 DIAGNOSIS — J82.83 EOSINOPHILIC ASTHMA: Primary | ICD-10-CM

## 2019-05-07 PROCEDURE — 2580000003 HC RX 258: Performed by: INTERNAL MEDICINE

## 2019-05-07 PROCEDURE — 6360000002 HC RX W HCPCS: Performed by: INTERNAL MEDICINE

## 2019-05-07 PROCEDURE — 6370000000 HC RX 637 (ALT 250 FOR IP): Performed by: INTERNAL MEDICINE

## 2019-05-07 PROCEDURE — 96372 THER/PROPH/DIAG INJ SC/IM: CPT

## 2019-05-07 RX ORDER — SODIUM CHLORIDE 0.9 % (FLUSH) 0.9 %
10 SYRINGE (ML) INJECTION PRN
Status: CANCELLED | OUTPATIENT
Start: 2019-05-21

## 2019-05-07 RX ORDER — EPINEPHRINE 1 MG/ML
0.3 INJECTION, SOLUTION, CONCENTRATE INTRAVENOUS PRN
Status: CANCELLED | OUTPATIENT
Start: 2019-05-21

## 2019-05-07 RX ORDER — SODIUM CHLORIDE 9 MG/ML
INJECTION, SOLUTION INTRAVENOUS CONTINUOUS
Status: CANCELLED | OUTPATIENT
Start: 2019-05-21

## 2019-05-07 RX ORDER — ACETAMINOPHEN 325 MG/1
650 TABLET ORAL ONCE
Status: CANCELLED | OUTPATIENT
Start: 2019-05-21

## 2019-05-07 RX ORDER — HEPARIN SODIUM (PORCINE) LOCK FLUSH IV SOLN 100 UNIT/ML 100 UNIT/ML
500 SOLUTION INTRAVENOUS PRN
Status: CANCELLED | OUTPATIENT
Start: 2019-05-21

## 2019-05-07 RX ORDER — SODIUM CHLORIDE 0.9 % (FLUSH) 0.9 %
5 SYRINGE (ML) INJECTION PRN
Status: CANCELLED | OUTPATIENT
Start: 2019-05-21

## 2019-05-07 RX ORDER — ACETAMINOPHEN 325 MG/1
650 TABLET ORAL ONCE
Status: COMPLETED | OUTPATIENT
Start: 2019-05-07 | End: 2019-05-07

## 2019-05-07 RX ORDER — METHYLPREDNISOLONE SODIUM SUCCINATE 125 MG/2ML
125 INJECTION, POWDER, LYOPHILIZED, FOR SOLUTION INTRAMUSCULAR; INTRAVENOUS ONCE
Status: CANCELLED | OUTPATIENT
Start: 2019-05-21

## 2019-05-07 RX ORDER — DIPHENHYDRAMINE HYDROCHLORIDE 50 MG/ML
50 INJECTION INTRAMUSCULAR; INTRAVENOUS ONCE
Status: CANCELLED | OUTPATIENT
Start: 2019-05-21

## 2019-05-07 RX ORDER — 0.9 % SODIUM CHLORIDE 0.9 %
10 VIAL (ML) INJECTION ONCE
Status: CANCELLED | OUTPATIENT
Start: 2019-05-21

## 2019-05-07 RX ADMIN — WATER 225 MG: 1 INJECTION INTRAMUSCULAR; INTRAVENOUS; SUBCUTANEOUS at 08:55

## 2019-05-07 RX ADMIN — ACETAMINOPHEN 650 MG: 325 TABLET, FILM COATED ORAL at 08:36

## 2019-05-07 ASSESSMENT — PAIN SCALES - GENERAL: PAINLEVEL_OUTOF10: 0

## 2019-05-07 NOTE — PROGRESS NOTES
Patient to complete Asthma Control Test form  (Xolair questionnaire) post injection and fax to physician office. [facility] Xolair Allergy Control Test    Prescribing Physician:    Was patient administered Xolair on appointed administration date? [x] yes [] no    Reason for not administering Xolair :[] Illness [] No show [] Other:    Was there any reaction to mediation? [] yes [x] no    If Yes: Reactions:      1. In the past 4 weeks, how much of the time did your asthma keep you from getting as much done as usual at work, school or at home? [] 1 [] 2 [] 3 [] 4 [x] 5   Score:______    2. During the past 4 weeks, how often have you had shortness of breath? [] 1 [] 2 [] 3 [x] 4 [] 5   Score:______    3. During the past four weeks, how often did your asthma symptoms (wheezing, coughing, shortness of breath, chest tightness, or pain) wake you up at night or earlier than usual in the morning? [] 1 [x] 2 [] 3 [] 4 [] 5   Score:______    4. During the past four weeks, how often have you used your rescue inhaler or nebulizer medication? [] 1 [] 2 [] 3 [] 4 [x] 5   Score:______    5. How would you rate your asthma control during the past 4 weeks? [] 1 [] 2 [x] 3 [] 4 [] 5   Score:______        Total Score:________19________        To score the Asthma control test: Each response to the 5 ACT questions has a point value from 1-5 as shown on the form. To score the ACT, add up the point value for each response to the 5 questions.

## 2019-05-13 ENCOUNTER — TELEPHONE (OUTPATIENT)
Dept: PULMONOLOGY | Age: 64
End: 2019-05-13

## 2019-05-13 NOTE — TELEPHONE ENCOUNTER
Called patient and informed him that his Phillip Petite was approved. Patient verbalized understanding.     RX Information:    BIN: [id number]  PCN: Jennie Colvin  GRP: [id number]

## 2019-05-21 ENCOUNTER — HOSPITAL ENCOUNTER (OUTPATIENT)
Dept: INFUSION THERAPY | Age: 64
Setting detail: INFUSION SERIES
Discharge: HOME | End: 2019-05-21
Payer: MEDICARE

## 2019-05-21 ENCOUNTER — TELEPHONE (OUTPATIENT)
Dept: PULMONOLOGY | Age: 64
End: 2019-05-21

## 2019-05-21 VITALS — SYSTOLIC BLOOD PRESSURE: 116 MMHG | DIASTOLIC BLOOD PRESSURE: 76 MMHG | RESPIRATION RATE: 16 BRPM | TEMPERATURE: 97 F

## 2019-05-21 DIAGNOSIS — J45.909 REACTIVE AIRWAY DISEASE WITHOUT COMPLICATION, UNSPECIFIED ASTHMA SEVERITY, UNSPECIFIED WHETHER PERSISTENT: ICD-10-CM

## 2019-05-21 DIAGNOSIS — J82.83 EOSINOPHILIC ASTHMA: Primary | ICD-10-CM

## 2019-05-21 PROCEDURE — 6370000000 HC RX 637 (ALT 250 FOR IP): Performed by: INTERNAL MEDICINE

## 2019-05-21 PROCEDURE — 2580000003 HC RX 258: Performed by: INTERNAL MEDICINE

## 2019-05-21 PROCEDURE — 6360000002 HC RX W HCPCS: Performed by: INTERNAL MEDICINE

## 2019-05-21 PROCEDURE — 96372 THER/PROPH/DIAG INJ SC/IM: CPT

## 2019-05-21 RX ORDER — 0.9 % SODIUM CHLORIDE 0.9 %
10 VIAL (ML) INJECTION ONCE
Status: CANCELLED | OUTPATIENT
Start: 2019-06-04

## 2019-05-21 RX ORDER — SODIUM CHLORIDE 0.9 % (FLUSH) 0.9 %
5 SYRINGE (ML) INJECTION PRN
Status: CANCELLED | OUTPATIENT
Start: 2019-06-04

## 2019-05-21 RX ORDER — SODIUM CHLORIDE 0.9 % (FLUSH) 0.9 %
10 SYRINGE (ML) INJECTION PRN
Status: CANCELLED | OUTPATIENT
Start: 2019-06-04

## 2019-05-21 RX ORDER — SODIUM CHLORIDE 9 MG/ML
INJECTION, SOLUTION INTRAVENOUS CONTINUOUS
Status: CANCELLED | OUTPATIENT
Start: 2019-06-04

## 2019-05-21 RX ORDER — METHYLPREDNISOLONE SODIUM SUCCINATE 125 MG/2ML
125 INJECTION, POWDER, LYOPHILIZED, FOR SOLUTION INTRAMUSCULAR; INTRAVENOUS ONCE
Status: CANCELLED | OUTPATIENT
Start: 2019-06-04

## 2019-05-21 RX ORDER — ACETAMINOPHEN 325 MG/1
650 TABLET ORAL ONCE
Status: COMPLETED | OUTPATIENT
Start: 2019-05-21 | End: 2019-05-21

## 2019-05-21 RX ORDER — EPINEPHRINE 1 MG/ML
0.3 INJECTION, SOLUTION, CONCENTRATE INTRAVENOUS PRN
Status: CANCELLED | OUTPATIENT
Start: 2019-06-04

## 2019-05-21 RX ORDER — DIPHENHYDRAMINE HYDROCHLORIDE 50 MG/ML
50 INJECTION INTRAMUSCULAR; INTRAVENOUS ONCE
Status: CANCELLED | OUTPATIENT
Start: 2019-06-04

## 2019-05-21 RX ORDER — HEPARIN SODIUM (PORCINE) LOCK FLUSH IV SOLN 100 UNIT/ML 100 UNIT/ML
500 SOLUTION INTRAVENOUS PRN
Status: CANCELLED | OUTPATIENT
Start: 2019-06-04

## 2019-05-21 RX ORDER — ACETAMINOPHEN 325 MG/1
650 TABLET ORAL ONCE
Status: CANCELLED | OUTPATIENT
Start: 2019-06-04

## 2019-05-21 RX ORDER — FLUTICASONE FUROATE AND VILANTEROL 100; 25 UG/1; UG/1
1 POWDER RESPIRATORY (INHALATION) DAILY
Qty: 1 EACH | Refills: 5 | Status: SHIPPED | OUTPATIENT
Start: 2019-05-21 | End: 2019-12-20 | Stop reason: SDUPTHER

## 2019-05-21 RX ADMIN — ACETAMINOPHEN 650 MG: 325 TABLET, FILM COATED ORAL at 09:15

## 2019-05-21 RX ADMIN — WATER 225 MG: 1 INJECTION INTRAMUSCULAR; INTRAVENOUS; SUBCUTANEOUS at 09:16

## 2019-05-21 ASSESSMENT — PAIN SCALES - GENERAL: PAINLEVEL_OUTOF10: 0

## 2019-05-21 NOTE — TELEPHONE ENCOUNTER
Pt sent email re: Nam Marte being transferred to [facility] after PA was done for [address]. RN reordered [employer] from [facility] and faxed to [facility] pharmacy(mail order) the PA for Breo Inhalers. Pt given sample of Breo 100 inhaler per Dr. Saima Alonso's orders until script can be mailed.

## 2019-06-04 ENCOUNTER — HOSPITAL ENCOUNTER (OUTPATIENT)
Dept: INFUSION THERAPY | Age: 64
Setting detail: INFUSION SERIES
Discharge: HOME | End: 2019-06-04
Payer: MEDICARE

## 2019-06-04 VITALS
SYSTOLIC BLOOD PRESSURE: 116 MMHG | TEMPERATURE: 97.9 F | DIASTOLIC BLOOD PRESSURE: 65 MMHG | RESPIRATION RATE: 18 BRPM | OXYGEN SATURATION: 96 % | HEART RATE: 89 BPM

## 2019-06-04 DIAGNOSIS — J82.83 EOSINOPHILIC ASTHMA: Primary | ICD-10-CM

## 2019-06-04 PROCEDURE — 2580000003 HC RX 258: Performed by: INTERNAL MEDICINE

## 2019-06-04 PROCEDURE — 6360000002 HC RX W HCPCS: Performed by: INTERNAL MEDICINE

## 2019-06-04 PROCEDURE — 6370000000 HC RX 637 (ALT 250 FOR IP): Performed by: INTERNAL MEDICINE

## 2019-06-04 PROCEDURE — 96372 THER/PROPH/DIAG INJ SC/IM: CPT

## 2019-06-04 RX ORDER — ACETAMINOPHEN 325 MG/1
650 TABLET ORAL ONCE
Status: COMPLETED | OUTPATIENT
Start: 2019-06-04 | End: 2019-06-04

## 2019-06-04 RX ORDER — HEPARIN SODIUM (PORCINE) LOCK FLUSH IV SOLN 100 UNIT/ML 100 UNIT/ML
500 SOLUTION INTRAVENOUS PRN
Status: CANCELLED | OUTPATIENT
Start: 2019-06-18

## 2019-06-04 RX ORDER — SODIUM CHLORIDE 0.9 % (FLUSH) 0.9 %
5 SYRINGE (ML) INJECTION PRN
Status: CANCELLED | OUTPATIENT
Start: 2019-06-18

## 2019-06-04 RX ORDER — DIPHENHYDRAMINE HYDROCHLORIDE 50 MG/ML
50 INJECTION INTRAMUSCULAR; INTRAVENOUS ONCE
Status: CANCELLED | OUTPATIENT
Start: 2019-06-18

## 2019-06-04 RX ORDER — SODIUM CHLORIDE 9 MG/ML
INJECTION, SOLUTION INTRAVENOUS CONTINUOUS
Status: CANCELLED | OUTPATIENT
Start: 2019-06-18

## 2019-06-04 RX ORDER — ACETAMINOPHEN 325 MG/1
650 TABLET ORAL ONCE
Status: CANCELLED | OUTPATIENT
Start: 2019-06-18

## 2019-06-04 RX ORDER — EPINEPHRINE 1 MG/ML
0.3 INJECTION, SOLUTION, CONCENTRATE INTRAVENOUS PRN
Status: CANCELLED | OUTPATIENT
Start: 2019-06-18

## 2019-06-04 RX ORDER — METHYLPREDNISOLONE SODIUM SUCCINATE 125 MG/2ML
125 INJECTION, POWDER, LYOPHILIZED, FOR SOLUTION INTRAMUSCULAR; INTRAVENOUS ONCE
Status: CANCELLED | OUTPATIENT
Start: 2019-06-18

## 2019-06-04 RX ORDER — 0.9 % SODIUM CHLORIDE 0.9 %
10 VIAL (ML) INJECTION ONCE
Status: CANCELLED | OUTPATIENT
Start: 2019-06-18

## 2019-06-04 RX ORDER — SODIUM CHLORIDE 0.9 % (FLUSH) 0.9 %
10 SYRINGE (ML) INJECTION PRN
Status: CANCELLED | OUTPATIENT
Start: 2019-06-18

## 2019-06-04 RX ADMIN — WATER 225 MG: 1 INJECTION INTRAMUSCULAR; INTRAVENOUS; SUBCUTANEOUS at 09:19

## 2019-06-04 RX ADMIN — ACETAMINOPHEN 650 MG: 325 TABLET, FILM COATED ORAL at 09:04

## 2019-06-04 ASSESSMENT — PAIN SCALES - GENERAL: PAINLEVEL_OUTOF10: 0

## 2019-06-04 NOTE — PROGRESS NOTES
[facility] Xolair Allergy Control Test    Prescribing Physician:    Was patient administered Xolair on appointed administration date? [x] yes [] no    Reason for not administering Xolair :[] Illness [] No show [] Other:    Was there any reaction to mediation? [] yes [x] no    If Yes: Reactions:      1. In the past 4 weeks, how much of the time did your asthma keep you from getting as much done as usual at work, school or at home? [] 1 [] 2 [] 3 [] 4 [x] 5   Score:______    2. During the past 4 weeks, how often have you had shortness of breath? [] 1 [] 2 [] 3 [x] 4 [] 5   Score:______    3. During the past four weeks, how often did your asthma symptoms (wheezing, coughing, shortness of breath, chest tightness, or pain) wake you up at night or earlier than usual in the morning? [] 1 [x] 2 [] 3 [] 4 [] 5   Score:______    4. During the past four weeks, how often have you used your rescue inhaler or nebulizer medication? [] 1 [] 2 [] 3 [] 4 [x] 5   Score:______    5. How would you rate your asthma control during the past 4 weeks? [] 1 [] 2 [x] 3 [] 4 [] 5   Score:______        Total Score:____19____________        To score the Asthma control test: Each response to the 5 ACT questions has a point value from 1-5 as shown on the form. To score the ACT, add up the point value for each response to the 5 questions.

## 2019-06-18 ENCOUNTER — HOSPITAL ENCOUNTER (OUTPATIENT)
Dept: INFUSION THERAPY | Age: 64
Setting detail: INFUSION SERIES
Discharge: HOME | End: 2019-06-18
Payer: MEDICARE

## 2019-06-18 VITALS
TEMPERATURE: 98.1 F | RESPIRATION RATE: 16 BRPM | HEART RATE: 68 BPM | OXYGEN SATURATION: 98 % | SYSTOLIC BLOOD PRESSURE: 123 MMHG | DIASTOLIC BLOOD PRESSURE: 63 MMHG

## 2019-06-18 DIAGNOSIS — J82.83 EOSINOPHILIC ASTHMA: Primary | ICD-10-CM

## 2019-06-18 PROCEDURE — 6360000002 HC RX W HCPCS: Performed by: INTERNAL MEDICINE

## 2019-06-18 PROCEDURE — 6370000000 HC RX 637 (ALT 250 FOR IP): Performed by: INTERNAL MEDICINE

## 2019-06-18 PROCEDURE — 96372 THER/PROPH/DIAG INJ SC/IM: CPT

## 2019-06-18 PROCEDURE — 2580000003 HC RX 258: Performed by: INTERNAL MEDICINE

## 2019-06-18 RX ORDER — ACETAMINOPHEN 325 MG/1
650 TABLET ORAL ONCE
Status: COMPLETED | OUTPATIENT
Start: 2019-06-18 | End: 2019-06-18

## 2019-06-18 RX ORDER — SODIUM CHLORIDE 9 MG/ML
INJECTION, SOLUTION INTRAVENOUS CONTINUOUS
Status: CANCELLED | OUTPATIENT
Start: 2019-07-02

## 2019-06-18 RX ORDER — METHYLPREDNISOLONE SODIUM SUCCINATE 125 MG/2ML
125 INJECTION, POWDER, LYOPHILIZED, FOR SOLUTION INTRAMUSCULAR; INTRAVENOUS ONCE
Status: CANCELLED | OUTPATIENT
Start: 2019-07-02

## 2019-06-18 RX ORDER — ACETAMINOPHEN 325 MG/1
650 TABLET ORAL ONCE
Status: CANCELLED | OUTPATIENT
Start: 2019-07-02

## 2019-06-18 RX ORDER — 0.9 % SODIUM CHLORIDE 0.9 %
10 VIAL (ML) INJECTION ONCE
Status: CANCELLED | OUTPATIENT
Start: 2019-07-02

## 2019-06-18 RX ORDER — SODIUM CHLORIDE 0.9 % (FLUSH) 0.9 %
5 SYRINGE (ML) INJECTION PRN
Status: CANCELLED | OUTPATIENT
Start: 2019-07-02

## 2019-06-18 RX ORDER — DIPHENHYDRAMINE HYDROCHLORIDE 50 MG/ML
50 INJECTION INTRAMUSCULAR; INTRAVENOUS ONCE
Status: CANCELLED | OUTPATIENT
Start: 2019-07-02

## 2019-06-18 RX ORDER — EPINEPHRINE 1 MG/ML
0.3 INJECTION, SOLUTION, CONCENTRATE INTRAVENOUS PRN
Status: CANCELLED | OUTPATIENT
Start: 2019-07-02

## 2019-06-18 RX ORDER — SODIUM CHLORIDE 0.9 % (FLUSH) 0.9 %
10 SYRINGE (ML) INJECTION PRN
Status: CANCELLED | OUTPATIENT
Start: 2019-07-02

## 2019-06-18 RX ORDER — HEPARIN SODIUM (PORCINE) LOCK FLUSH IV SOLN 100 UNIT/ML 100 UNIT/ML
500 SOLUTION INTRAVENOUS PRN
Status: CANCELLED | OUTPATIENT
Start: 2019-07-02

## 2019-06-18 RX ADMIN — ACETAMINOPHEN 650 MG: 325 TABLET, FILM COATED ORAL at 13:08

## 2019-06-18 RX ADMIN — WATER 225 MG: 1 INJECTION INTRAMUSCULAR; INTRAVENOUS; SUBCUTANEOUS at 13:17

## 2019-06-18 NOTE — PROGRESS NOTES
[facility] Xolair Allergy Control Test    Prescribing Physician:    Was patient administered Xolair on appointed administration date? [x] yes [] no    Reason for not administering Xolair :[] Illness [] No show [] Other:    Was there any reaction to mediation? [] yes [] no    If Yes: Reactions:      1. In the past 4 weeks, how much of the time did your asthma keep you from getting as much done as usual at work, school or at home? [] 1 [] 2 [] 3 [] 4 [x] 5   Score:___5___    2. During the past 4 weeks, how often have you had shortness of breath? [] 1 [] 2 [] 3 [x] 4 [] 5   Score:_4_____    3. During the past four weeks, how often did your asthma symptoms (wheezing, coughing, shortness of breath, chest tightness, or pain) wake you up at night or earlier than usual in the morning? [] 1 [] 2 [] 3 [] 4 [x] 5   Score:___5___    4. During the past four weeks, how often have you used your rescue inhaler or nebulizer medication? [] 1 [] 2 [] 3 [] 4 [x] 5   Score:__5____    5. How would you rate your asthma control during the past 4 weeks? [] 1 [] 2 [] 3 [x] 4 [] 5   Score:__4____        Total Score:______23________        To score the Asthma control test: Each response to the 5 ACT questions has a point value from 1-5 as shown on the form. To score the ACT, add up the point value for each response to the 5 questions.

## 2019-07-01 DIAGNOSIS — J82.83 EOSINOPHILIC ASTHMA: Primary | ICD-10-CM

## 2019-07-01 RX ORDER — EPINEPHRINE 0.3 MG/.3ML
INJECTION SUBCUTANEOUS
Qty: 0.3 ML | Refills: 0 | Status: SHIPPED | OUTPATIENT
Start: 2019-07-01

## 2019-07-02 ENCOUNTER — HOSPITAL ENCOUNTER (OUTPATIENT)
Dept: INFUSION THERAPY | Age: 64
Setting detail: INFUSION SERIES
Discharge: HOME | End: 2019-07-02
Payer: MEDICARE

## 2019-07-02 VITALS
TEMPERATURE: 96.8 F | DIASTOLIC BLOOD PRESSURE: 60 MMHG | OXYGEN SATURATION: 97 % | RESPIRATION RATE: 16 BRPM | SYSTOLIC BLOOD PRESSURE: 90 MMHG | HEART RATE: 70 BPM

## 2019-07-02 DIAGNOSIS — J82.83 EOSINOPHILIC ASTHMA: Primary | ICD-10-CM

## 2019-07-02 PROCEDURE — 2580000003 HC RX 258: Performed by: INTERNAL MEDICINE

## 2019-07-02 PROCEDURE — 6360000002 HC RX W HCPCS: Performed by: INTERNAL MEDICINE

## 2019-07-02 PROCEDURE — 96372 THER/PROPH/DIAG INJ SC/IM: CPT

## 2019-07-02 RX ORDER — METHYLPREDNISOLONE SODIUM SUCCINATE 125 MG/2ML
125 INJECTION, POWDER, LYOPHILIZED, FOR SOLUTION INTRAMUSCULAR; INTRAVENOUS ONCE
Status: CANCELLED | OUTPATIENT
Start: 2019-07-16

## 2019-07-02 RX ORDER — EPINEPHRINE 1 MG/ML
0.3 INJECTION, SOLUTION, CONCENTRATE INTRAVENOUS PRN
Status: CANCELLED | OUTPATIENT
Start: 2019-07-16

## 2019-07-02 RX ORDER — ACETAMINOPHEN 325 MG/1
650 TABLET ORAL ONCE
Status: DISCONTINUED | OUTPATIENT
Start: 2019-07-02 | End: 2019-07-03 | Stop reason: HOSPADM

## 2019-07-02 RX ORDER — SODIUM CHLORIDE 0.9 % (FLUSH) 0.9 %
5 SYRINGE (ML) INJECTION PRN
Status: CANCELLED | OUTPATIENT
Start: 2019-07-16

## 2019-07-02 RX ORDER — IBUPROFEN 200 MG
400 TABLET ORAL EVERY 6 HOURS PRN
COMMUNITY

## 2019-07-02 RX ORDER — 0.9 % SODIUM CHLORIDE 0.9 %
10 VIAL (ML) INJECTION ONCE
Status: CANCELLED | OUTPATIENT
Start: 2019-07-16

## 2019-07-02 RX ORDER — DIPHENHYDRAMINE HYDROCHLORIDE 50 MG/ML
50 INJECTION INTRAMUSCULAR; INTRAVENOUS ONCE
Status: CANCELLED | OUTPATIENT
Start: 2019-07-16

## 2019-07-02 RX ORDER — HEPARIN SODIUM (PORCINE) LOCK FLUSH IV SOLN 100 UNIT/ML 100 UNIT/ML
500 SOLUTION INTRAVENOUS PRN
Status: CANCELLED | OUTPATIENT
Start: 2019-07-16

## 2019-07-02 RX ORDER — ACETAMINOPHEN 325 MG/1
650 TABLET ORAL ONCE
Status: CANCELLED | OUTPATIENT
Start: 2019-07-16

## 2019-07-02 RX ORDER — SODIUM CHLORIDE 9 MG/ML
INJECTION, SOLUTION INTRAVENOUS CONTINUOUS
Status: CANCELLED | OUTPATIENT
Start: 2019-07-16

## 2019-07-02 RX ORDER — SODIUM CHLORIDE 0.9 % (FLUSH) 0.9 %
10 SYRINGE (ML) INJECTION PRN
Status: CANCELLED | OUTPATIENT
Start: 2019-07-16

## 2019-07-02 RX ADMIN — WATER 225 MG: 1 INJECTION INTRAMUSCULAR; INTRAVENOUS; SUBCUTANEOUS at 09:19

## 2019-07-16 ENCOUNTER — HOSPITAL ENCOUNTER (OUTPATIENT)
Dept: INFUSION THERAPY | Age: 64
Setting detail: INFUSION SERIES
Discharge: HOME | End: 2019-07-16
Payer: MEDICARE

## 2019-07-16 VITALS
OXYGEN SATURATION: 98 % | TEMPERATURE: 97.3 F | DIASTOLIC BLOOD PRESSURE: 78 MMHG | SYSTOLIC BLOOD PRESSURE: 138 MMHG | HEART RATE: 64 BPM

## 2019-07-16 DIAGNOSIS — J82.83 EOSINOPHILIC ASTHMA: Primary | ICD-10-CM

## 2019-07-16 PROCEDURE — 2580000003 HC RX 258: Performed by: INTERNAL MEDICINE

## 2019-07-16 PROCEDURE — 6360000002 HC RX W HCPCS: Performed by: INTERNAL MEDICINE

## 2019-07-16 PROCEDURE — 96372 THER/PROPH/DIAG INJ SC/IM: CPT

## 2019-07-16 RX ORDER — DIPHENHYDRAMINE HYDROCHLORIDE 50 MG/ML
50 INJECTION INTRAMUSCULAR; INTRAVENOUS ONCE
Status: CANCELLED | OUTPATIENT
Start: 2019-07-30

## 2019-07-16 RX ORDER — ACETAMINOPHEN 325 MG/1
650 TABLET ORAL ONCE
Status: CANCELLED | OUTPATIENT
Start: 2019-07-30

## 2019-07-16 RX ORDER — SODIUM CHLORIDE 0.9 % (FLUSH) 0.9 %
5 SYRINGE (ML) INJECTION PRN
Status: CANCELLED | OUTPATIENT
Start: 2019-07-30

## 2019-07-16 RX ORDER — EPINEPHRINE 1 MG/ML
0.3 INJECTION, SOLUTION, CONCENTRATE INTRAVENOUS PRN
Status: CANCELLED | OUTPATIENT
Start: 2019-07-30

## 2019-07-16 RX ORDER — 0.9 % SODIUM CHLORIDE 0.9 %
10 VIAL (ML) INJECTION ONCE
Status: CANCELLED | OUTPATIENT
Start: 2019-07-30

## 2019-07-16 RX ORDER — METHYLPREDNISOLONE SODIUM SUCCINATE 125 MG/2ML
125 INJECTION, POWDER, LYOPHILIZED, FOR SOLUTION INTRAMUSCULAR; INTRAVENOUS ONCE
Status: CANCELLED | OUTPATIENT
Start: 2019-07-30

## 2019-07-16 RX ORDER — SODIUM CHLORIDE 9 MG/ML
INJECTION, SOLUTION INTRAVENOUS CONTINUOUS
Status: CANCELLED | OUTPATIENT
Start: 2019-07-30

## 2019-07-16 RX ORDER — HEPARIN SODIUM (PORCINE) LOCK FLUSH IV SOLN 100 UNIT/ML 100 UNIT/ML
500 SOLUTION INTRAVENOUS PRN
Status: CANCELLED | OUTPATIENT
Start: 2019-07-30

## 2019-07-16 RX ORDER — ACETAMINOPHEN 325 MG/1
650 TABLET ORAL ONCE
Status: DISCONTINUED | OUTPATIENT
Start: 2019-07-16 | End: 2019-07-17 | Stop reason: HOSPADM

## 2019-07-16 RX ORDER — SODIUM CHLORIDE 0.9 % (FLUSH) 0.9 %
10 SYRINGE (ML) INJECTION PRN
Status: CANCELLED | OUTPATIENT
Start: 2019-07-30

## 2019-07-16 RX ADMIN — WATER 225 MG: 1 INJECTION INTRAMUSCULAR; INTRAVENOUS; SUBCUTANEOUS at 09:26

## 2019-07-16 NOTE — PROGRESS NOTES
[facility] Xolair Allergy Control Test    Prescribing Physician:    Was patient administered Xolair on appointed administration date? [x] yes [] no    Reason for not administering Xolair :[] Illness [] No show [] Other:    Was there any reaction to mediation? [] yes [x] no    If Yes: Reactions:      1. In the past 4 weeks, how much of the time did your asthma keep you from getting as much done as usual at work, school or at home? [] 1 [] 2 [] 3 [] 4 [x] 5   Score:______    2. During the past 4 weeks, how often have you had shortness of breath? [] 1 [] 2 [] 3 [] 4 [x] 5   Score:______    3. During the past four weeks, how often did your asthma symptoms (wheezing, coughing, shortness of breath, chest tightness, or pain) wake you up at night or earlier than usual in the morning? [] 1 [] 2 [x] 3 [] 4 [] 5   Score:______    4. During the past four weeks, how often have you used your rescue inhaler or nebulizer medication? [] 1 [] 2 [] 3 [] 4 [x] 5   Score:______    5. How would you rate your asthma control during the past 4 weeks? [] 1 [] 2 [x] 3 [] 4 [] 5   Score:______        Total Score:_____________21___        To score the Asthma control test: Each response to the 5 ACT questions has a point value from 1-5 as shown on the form. To score the ACT, add up the point value for each response to the 5 questions.

## 2019-07-30 ENCOUNTER — HOSPITAL ENCOUNTER (OUTPATIENT)
Dept: INFUSION THERAPY | Age: 64
Setting detail: INFUSION SERIES
Discharge: HOME | End: 2019-07-30
Payer: MEDICARE

## 2019-07-30 VITALS
OXYGEN SATURATION: 95 % | DIASTOLIC BLOOD PRESSURE: 61 MMHG | TEMPERATURE: 98.8 F | SYSTOLIC BLOOD PRESSURE: 102 MMHG | HEART RATE: 61 BPM | RESPIRATION RATE: 18 BRPM

## 2019-07-30 DIAGNOSIS — J82.83 EOSINOPHILIC ASTHMA: Primary | ICD-10-CM

## 2019-07-30 PROCEDURE — 96372 THER/PROPH/DIAG INJ SC/IM: CPT

## 2019-07-30 PROCEDURE — 6360000002 HC RX W HCPCS: Performed by: INTERNAL MEDICINE

## 2019-07-30 PROCEDURE — 2580000003 HC RX 258: Performed by: INTERNAL MEDICINE

## 2019-07-30 RX ORDER — SODIUM CHLORIDE 9 MG/ML
INJECTION, SOLUTION INTRAVENOUS CONTINUOUS
Status: CANCELLED | OUTPATIENT
Start: 2019-08-13

## 2019-07-30 RX ORDER — ACETAMINOPHEN 325 MG/1
650 TABLET ORAL ONCE
Status: DISCONTINUED | OUTPATIENT
Start: 2019-07-30 | End: 2019-07-31 | Stop reason: HOSPADM

## 2019-07-30 RX ORDER — HEPARIN SODIUM (PORCINE) LOCK FLUSH IV SOLN 100 UNIT/ML 100 UNIT/ML
500 SOLUTION INTRAVENOUS PRN
Status: CANCELLED | OUTPATIENT
Start: 2019-08-13

## 2019-07-30 RX ORDER — DIPHENHYDRAMINE HYDROCHLORIDE 50 MG/ML
50 INJECTION INTRAMUSCULAR; INTRAVENOUS ONCE
Status: CANCELLED | OUTPATIENT
Start: 2019-08-13

## 2019-07-30 RX ORDER — SODIUM CHLORIDE 0.9 % (FLUSH) 0.9 %
5 SYRINGE (ML) INJECTION PRN
Status: CANCELLED | OUTPATIENT
Start: 2019-08-13

## 2019-07-30 RX ORDER — 0.9 % SODIUM CHLORIDE 0.9 %
10 VIAL (ML) INJECTION ONCE
Status: CANCELLED | OUTPATIENT
Start: 2019-08-13

## 2019-07-30 RX ORDER — SODIUM CHLORIDE 0.9 % (FLUSH) 0.9 %
10 SYRINGE (ML) INJECTION PRN
Status: CANCELLED | OUTPATIENT
Start: 2019-08-13

## 2019-07-30 RX ORDER — METHYLPREDNISOLONE SODIUM SUCCINATE 125 MG/2ML
125 INJECTION, POWDER, LYOPHILIZED, FOR SOLUTION INTRAMUSCULAR; INTRAVENOUS ONCE
Status: CANCELLED | OUTPATIENT
Start: 2019-08-13

## 2019-07-30 RX ORDER — ACETAMINOPHEN 325 MG/1
650 TABLET ORAL ONCE
Status: CANCELLED | OUTPATIENT
Start: 2019-08-13

## 2019-07-30 RX ORDER — EPINEPHRINE 1 MG/ML
0.3 INJECTION, SOLUTION, CONCENTRATE INTRAVENOUS PRN
Status: CANCELLED | OUTPATIENT
Start: 2019-08-13

## 2019-07-30 RX ADMIN — WATER 225 MG: 1 INJECTION INTRAMUSCULAR; INTRAVENOUS; SUBCUTANEOUS at 09:21

## 2019-08-01 ENCOUNTER — OFFICE VISIT (OUTPATIENT)
Dept: PULMONOLOGY | Age: 64
End: 2019-08-01
Payer: MEDICARE

## 2019-08-01 VITALS
HEIGHT: 73 IN | HEART RATE: 74 BPM | BODY MASS INDEX: 28.63 KG/M2 | SYSTOLIC BLOOD PRESSURE: 113 MMHG | DIASTOLIC BLOOD PRESSURE: 65 MMHG | WEIGHT: 216 LBS | OXYGEN SATURATION: 96 % | RESPIRATION RATE: 18 BRPM

## 2019-08-01 DIAGNOSIS — J82.83 EOSINOPHILIC ASTHMA: ICD-10-CM

## 2019-08-01 DIAGNOSIS — J45.40 MODERATE PERSISTENT ASTHMA, UNSPECIFIED WHETHER COMPLICATED: Primary | ICD-10-CM

## 2019-08-01 LAB — FENO: 22 PPB

## 2019-08-01 PROCEDURE — 99213 OFFICE O/P EST LOW 20 MIN: CPT | Performed by: INTERNAL MEDICINE

## 2019-08-01 PROCEDURE — 95012 NITRIC OXIDE EXP GAS DETER: CPT | Performed by: INTERNAL MEDICINE

## 2019-08-01 ASSESSMENT — PULMONARY FUNCTION TESTS: FENO: 22

## 2019-08-01 NOTE — PROGRESS NOTES
Department of Internal Medicine  Division of Pulmonary, Critical Care & Sleep Medicine  Pulmonary [address] [facility]                                             Pulmonary Clinic Consult     I had the pleasure of seeing  Lin Armendariz in the [facility] regarding their Cough       Chief Complaint   Patient presents with    Follow-up     4 months, no complaints       HISTORY OF PRESENT ILLNESS:    Lin Armendariz is a 75y.o. year old  Who smoke less than a year and he never has second hand smoking ,    He comes in with cough that has been going on for the last 3 year and he thought it was sinuses and he was seen by Dr Arabella Jennings and given Qvar and had cat and dog and it was contributed     Qvar helps some but sputum comes back and  he has voice loss and tickle in throat     He walk 1 miles with no issues ,. He has some postnasal drainage   He denies any wheezing . seen by ENT and he could not find any thing       He/She  has cough ,productive for   Sputum and it is more in the       Sleep history :  Snoring yes   Feel tired during the day ,wake fresh   Wake up fresh in the morning   excessive daytime sleepiness no     Today visit  He states that he feels better with xolair   continue on Breo  States that cough has improved but its is much less and sputum much less      ALLERGIES:  No Known Allergies    PAST MEDICAL HISTORY:       Diagnosis Date    Acid reflux     Asthma     GERD (gastroesophageal reflux disease)     Retinal detachment 07/2017       MEDICATIONS:   Current Outpatient Medications   Medication Sig Dispense Refill    ibuprofen (ADVIL;MOTRIN) 200 MG tablet Take 400 mg by mouth every 6 hours as needed for Pain      EPINEPHrine (EPIPEN 2-SATISH) 0.3 MG/0.3ML SOAJ injection Use as directed for allergic reaction to Xolair Injections 0.3 mL 0    fluticasone-vilanterol (BREO ELLIPTA) 100-25 MCG/INH AEPB inhaler Inhale 1 puff into the lungs daily 1 each 5    polyarthritis/inflammatory joint disease. PHYSICAL EXAMINATION:  Vitals:    08/01/19 0935   BP: 113/65   Pulse: 74   Resp: 18   SpO2: 96%     Constitutional: This is a well developed, well nourished 75y.o. year old male who is alert, oriented, cooperative and in no apparent distress. Head was normocephalic and atraumatic. EENT: Mallampati class :  Extraocular muscles intact. External canals are patent and no discharge was appreciated. Septum was midline,   mucosa was without erythema, exudates or cobblestoning. No thrush was noted. Neck: Supple without thyromegaly. No elevated JVP. Trachea was midline. No carotid bruits were auscultated. Respiratory: scattered rhonchi    Cardiovascular: Regular without murmur, clicks, gallops or rubs. There is no left or right ventricular heave. Pulses:  Carotid, radial and femoral pulses were equally bilaterally. Abdomen: Slightly rounded and soft without organomegaly. No rebound, rigidity or guarding was appreciated. Lymphatic: No lymphadenopathy. Musculoskeletal: no joint pain. Extremities: no edema  Skin:  Warm and dry. Good color, turgor and pigmentation. No lesions or scars. Neurological/Psychiatric: The patient's general behavior, level of consciousness, thought content and emotional status is normal.  Cranial nerves II-XII are intact.       DATA:     Ordered    IMPRESSION:    1-reactive Upper airway disease  2-chronic cough   3-Hoarseness   4-Post nasal draine    5- mild GERD           PLAN:      Shift is very pleasant 49-year-old male who has history of 3 years chronic cough that was treated partially with the Qvar and he did well and he relapsed, there is no hemoptysis or retroflex symptoms except his cough has not been better and he came for further evaluation.  -Eosinophilic count 165  IgE 714    Respiratory allergy profile ,multiple allergy reviewed  On Bro   Stop Spiriva,.contineu Xolair to finish 1 year,will then hold and if relapse ,will try IL5 blockers          Flu and Pneumovax     Thank you for allowing me to participate in [facility]. I will keep following with you ,should you have any concerns ,please contact me at [phone]     Sincerely,        Ksenia Faye MD  Pulmonary & Critical Care Medicine     NOTE: This report was transcribed using voice recognition software. Every effort was made to ensure accuracy; however, inadvertent computerized transcription errors may be present.

## 2019-08-13 ENCOUNTER — TELEPHONE (OUTPATIENT)
Dept: PULMONOLOGY | Age: 64
End: 2019-08-13

## 2019-08-13 ENCOUNTER — HOSPITAL ENCOUNTER (OUTPATIENT)
Dept: INFUSION THERAPY | Age: 64
Setting detail: INFUSION SERIES
Discharge: HOME | End: 2019-08-13
Payer: MEDICARE

## 2019-08-13 VITALS
DIASTOLIC BLOOD PRESSURE: 66 MMHG | RESPIRATION RATE: 18 BRPM | OXYGEN SATURATION: 96 % | SYSTOLIC BLOOD PRESSURE: 98 MMHG | TEMPERATURE: 97.8 F | HEART RATE: 59 BPM

## 2019-08-13 DIAGNOSIS — J82.83 EOSINOPHILIC ASTHMA: Primary | ICD-10-CM

## 2019-08-13 DIAGNOSIS — J45.40 MODERATE PERSISTENT ASTHMA, UNSPECIFIED WHETHER COMPLICATED: Primary | ICD-10-CM

## 2019-08-13 PROCEDURE — 96372 THER/PROPH/DIAG INJ SC/IM: CPT

## 2019-08-13 PROCEDURE — 6360000002 HC RX W HCPCS: Performed by: INTERNAL MEDICINE

## 2019-08-13 RX ORDER — SODIUM CHLORIDE 9 MG/ML
INJECTION, SOLUTION INTRAVENOUS CONTINUOUS
Status: CANCELLED | OUTPATIENT
Start: 2019-08-20

## 2019-08-13 RX ORDER — METHYLPREDNISOLONE SODIUM SUCCINATE 125 MG/2ML
125 INJECTION, POWDER, LYOPHILIZED, FOR SOLUTION INTRAMUSCULAR; INTRAVENOUS ONCE
Status: CANCELLED | OUTPATIENT
Start: 2019-08-20

## 2019-08-13 RX ORDER — EPINEPHRINE 1 MG/ML
0.3 INJECTION, SOLUTION, CONCENTRATE INTRAVENOUS PRN
Status: CANCELLED | OUTPATIENT
Start: 2019-08-20

## 2019-08-13 RX ORDER — SODIUM CHLORIDE 0.9 % (FLUSH) 0.9 %
5 SYRINGE (ML) INJECTION PRN
Status: CANCELLED | OUTPATIENT
Start: 2019-08-20

## 2019-08-13 RX ORDER — SODIUM CHLORIDE 0.9 % (FLUSH) 0.9 %
10 SYRINGE (ML) INJECTION PRN
Status: CANCELLED | OUTPATIENT
Start: 2019-08-20

## 2019-08-13 RX ORDER — ACETAMINOPHEN 325 MG/1
650 TABLET ORAL ONCE
Status: DISCONTINUED | OUTPATIENT
Start: 2019-08-13 | End: 2019-08-14 | Stop reason: HOSPADM

## 2019-08-13 RX ORDER — 0.9 % SODIUM CHLORIDE 0.9 %
10 VIAL (ML) INJECTION ONCE
Status: CANCELLED | OUTPATIENT
Start: 2019-08-20

## 2019-08-13 RX ORDER — ACETAMINOPHEN 325 MG/1
650 TABLET ORAL ONCE
Status: CANCELLED | OUTPATIENT
Start: 2019-08-20

## 2019-08-13 RX ORDER — HEPARIN SODIUM (PORCINE) LOCK FLUSH IV SOLN 100 UNIT/ML 100 UNIT/ML
500 SOLUTION INTRAVENOUS PRN
Status: CANCELLED | OUTPATIENT
Start: 2019-08-20

## 2019-08-13 RX ORDER — DIPHENHYDRAMINE HYDROCHLORIDE 50 MG/ML
50 INJECTION INTRAMUSCULAR; INTRAVENOUS ONCE
Status: CANCELLED | OUTPATIENT
Start: 2019-08-20

## 2019-08-13 RX ADMIN — OMALIZUMAB 225 MG: 150 INJECTION, SOLUTION SUBCUTANEOUS at 09:18

## 2019-08-27 ENCOUNTER — HOSPITAL ENCOUNTER (OUTPATIENT)
Dept: INFUSION THERAPY | Age: 64
Setting detail: INFUSION SERIES
Discharge: HOME | End: 2019-08-27
Payer: MEDICARE

## 2019-08-27 VITALS
HEART RATE: 81 BPM | OXYGEN SATURATION: 97 % | TEMPERATURE: 98.3 F | RESPIRATION RATE: 18 BRPM | SYSTOLIC BLOOD PRESSURE: 117 MMHG | DIASTOLIC BLOOD PRESSURE: 52 MMHG

## 2019-08-27 DIAGNOSIS — J82.83 EOSINOPHILIC ASTHMA: Primary | ICD-10-CM

## 2019-08-27 PROCEDURE — 96372 THER/PROPH/DIAG INJ SC/IM: CPT

## 2019-08-27 PROCEDURE — 6360000002 HC RX W HCPCS: Performed by: INTERNAL MEDICINE

## 2019-08-27 RX ORDER — SODIUM CHLORIDE 0.9 % (FLUSH) 0.9 %
5 SYRINGE (ML) INJECTION PRN
Status: CANCELLED | OUTPATIENT
Start: 2019-09-10

## 2019-08-27 RX ORDER — HEPARIN SODIUM (PORCINE) LOCK FLUSH IV SOLN 100 UNIT/ML 100 UNIT/ML
500 SOLUTION INTRAVENOUS PRN
Status: CANCELLED | OUTPATIENT
Start: 2019-09-10

## 2019-08-27 RX ORDER — EPINEPHRINE 1 MG/ML
0.3 INJECTION, SOLUTION, CONCENTRATE INTRAVENOUS PRN
Status: CANCELLED | OUTPATIENT
Start: 2019-09-10

## 2019-08-27 RX ORDER — 0.9 % SODIUM CHLORIDE 0.9 %
10 VIAL (ML) INJECTION ONCE
Status: CANCELLED | OUTPATIENT
Start: 2019-09-10

## 2019-08-27 RX ORDER — SODIUM CHLORIDE 0.9 % (FLUSH) 0.9 %
10 SYRINGE (ML) INJECTION PRN
Status: CANCELLED | OUTPATIENT
Start: 2019-09-10

## 2019-08-27 RX ORDER — DIPHENHYDRAMINE HYDROCHLORIDE 50 MG/ML
50 INJECTION INTRAMUSCULAR; INTRAVENOUS ONCE
Status: CANCELLED | OUTPATIENT
Start: 2019-09-10

## 2019-08-27 RX ORDER — METHYLPREDNISOLONE SODIUM SUCCINATE 125 MG/2ML
125 INJECTION, POWDER, LYOPHILIZED, FOR SOLUTION INTRAMUSCULAR; INTRAVENOUS ONCE
Status: CANCELLED | OUTPATIENT
Start: 2019-09-10

## 2019-08-27 RX ORDER — ACETAMINOPHEN 325 MG/1
650 TABLET ORAL ONCE
Status: CANCELLED | OUTPATIENT
Start: 2019-09-10

## 2019-08-27 RX ORDER — SODIUM CHLORIDE 9 MG/ML
INJECTION, SOLUTION INTRAVENOUS CONTINUOUS
Status: CANCELLED | OUTPATIENT
Start: 2019-09-10

## 2019-08-27 RX ORDER — ACETAMINOPHEN 325 MG/1
650 TABLET ORAL ONCE
Status: DISCONTINUED | OUTPATIENT
Start: 2019-08-27 | End: 2019-08-28 | Stop reason: HOSPADM

## 2019-08-27 RX ADMIN — OMALIZUMAB 300 MG: 150 INJECTION, SOLUTION SUBCUTANEOUS at 09:12

## 2019-09-10 ENCOUNTER — HOSPITAL ENCOUNTER (OUTPATIENT)
Dept: INFUSION THERAPY | Age: 64
Setting detail: INFUSION SERIES
Discharge: HOME | End: 2019-09-10
Payer: MEDICARE

## 2019-09-10 VITALS
DIASTOLIC BLOOD PRESSURE: 56 MMHG | OXYGEN SATURATION: 98 % | HEART RATE: 60 BPM | TEMPERATURE: 97.1 F | SYSTOLIC BLOOD PRESSURE: 111 MMHG | RESPIRATION RATE: 16 BRPM

## 2019-09-10 DIAGNOSIS — J82.83 EOSINOPHILIC ASTHMA: Primary | ICD-10-CM

## 2019-09-10 PROCEDURE — 96372 THER/PROPH/DIAG INJ SC/IM: CPT

## 2019-09-10 PROCEDURE — 6360000002 HC RX W HCPCS: Performed by: INTERNAL MEDICINE

## 2019-09-10 RX ORDER — HEPARIN SODIUM (PORCINE) LOCK FLUSH IV SOLN 100 UNIT/ML 100 UNIT/ML
500 SOLUTION INTRAVENOUS PRN
Status: CANCELLED | OUTPATIENT
Start: 2019-09-24

## 2019-09-10 RX ORDER — ACETAMINOPHEN 325 MG/1
650 TABLET ORAL ONCE
Status: CANCELLED | OUTPATIENT
Start: 2019-09-24

## 2019-09-10 RX ORDER — DIPHENHYDRAMINE HYDROCHLORIDE 50 MG/ML
50 INJECTION INTRAMUSCULAR; INTRAVENOUS ONCE
Status: CANCELLED | OUTPATIENT
Start: 2019-09-24

## 2019-09-10 RX ORDER — METHYLPREDNISOLONE SODIUM SUCCINATE 125 MG/2ML
125 INJECTION, POWDER, LYOPHILIZED, FOR SOLUTION INTRAMUSCULAR; INTRAVENOUS ONCE
Status: CANCELLED | OUTPATIENT
Start: 2019-09-24

## 2019-09-10 RX ORDER — SODIUM CHLORIDE 0.9 % (FLUSH) 0.9 %
10 SYRINGE (ML) INJECTION PRN
Status: CANCELLED | OUTPATIENT
Start: 2019-09-24

## 2019-09-10 RX ORDER — EPINEPHRINE 1 MG/ML
0.3 INJECTION, SOLUTION, CONCENTRATE INTRAVENOUS PRN
Status: CANCELLED | OUTPATIENT
Start: 2019-09-24

## 2019-09-10 RX ORDER — SODIUM CHLORIDE 0.9 % (FLUSH) 0.9 %
5 SYRINGE (ML) INJECTION PRN
Status: CANCELLED | OUTPATIENT
Start: 2019-09-24

## 2019-09-10 RX ORDER — ACETAMINOPHEN 325 MG/1
650 TABLET ORAL ONCE
Status: DISCONTINUED | OUTPATIENT
Start: 2019-09-10 | End: 2019-09-11 | Stop reason: HOSPADM

## 2019-09-10 RX ORDER — SODIUM CHLORIDE 9 MG/ML
INJECTION, SOLUTION INTRAVENOUS CONTINUOUS
Status: CANCELLED | OUTPATIENT
Start: 2019-09-24

## 2019-09-10 RX ORDER — 0.9 % SODIUM CHLORIDE 0.9 %
10 VIAL (ML) INJECTION ONCE
Status: CANCELLED | OUTPATIENT
Start: 2019-09-24

## 2019-09-10 RX ADMIN — OMALIZUMAB 300 MG: 150 INJECTION, SOLUTION SUBCUTANEOUS at 08:43

## 2019-09-10 NOTE — PROGRESS NOTES
[address] [place] [address] Allergy Control Test    Prescribing Physician: Shalini Tanner    Was patient administered Xolair on appointed administration date? [x] yes [] no    Reason for not administering Xolair :[] Illness [] No show [] Other:    Was there any reaction to mediation? [] yes [x] no    If Yes: Reactions:      1. In the past 4 weeks, how much of the time did your asthma keep you from getting as much done as usual at work, school or at home? [] 1 [] 2 [] 3 [] 4 [x] 5   Score:__5____    2. During the past 4 weeks, how often have you had shortness of breath? [] 1 [] 2 [] 3 [x] 4 [] 5   Score:___4___    3. During the past four weeks, how often did your asthma symptoms (wheezing, coughing, shortness of breath, chest tightness, or pain) wake you up at night or earlier than usual in the morning? [] 1 [x] 2 [] 3 [] 4 [] 5   Score:___2___    4. During the past four weeks, how often have you used your rescue inhaler or nebulizer medication? [] 1 [] 2 [] 3 [] 4 [x] 5   Score:__5____    5. How would you rate your asthma control during the past 4 weeks? [] 1 [] 2 [x] 3 [] 4 [] 5   Score:__3____        Total Score:_____19___________        To score the Asthma control test: Each response to the 5 ACT questions has a point value from 1-5 as shown on the form. To score the ACT, add up the point value for each response to the 5 questions.

## 2019-09-24 ENCOUNTER — HOSPITAL ENCOUNTER (OUTPATIENT)
Dept: INFUSION THERAPY | Age: 64
Setting detail: INFUSION SERIES
Discharge: HOME | End: 2019-09-24
Payer: MEDICARE

## 2019-09-24 VITALS
RESPIRATION RATE: 16 BRPM | SYSTOLIC BLOOD PRESSURE: 124 MMHG | DIASTOLIC BLOOD PRESSURE: 62 MMHG | HEART RATE: 65 BPM | TEMPERATURE: 97.8 F | OXYGEN SATURATION: 97 %

## 2019-09-24 DIAGNOSIS — J82.83 EOSINOPHILIC ASTHMA: Primary | ICD-10-CM

## 2019-09-24 PROCEDURE — 6360000002 HC RX W HCPCS: Performed by: INTERNAL MEDICINE

## 2019-09-24 PROCEDURE — 96372 THER/PROPH/DIAG INJ SC/IM: CPT

## 2019-09-24 RX ORDER — METHYLPREDNISOLONE SODIUM SUCCINATE 125 MG/2ML
125 INJECTION, POWDER, LYOPHILIZED, FOR SOLUTION INTRAMUSCULAR; INTRAVENOUS ONCE
Status: CANCELLED | OUTPATIENT
Start: 2019-10-08

## 2019-09-24 RX ORDER — SODIUM CHLORIDE 0.9 % (FLUSH) 0.9 %
5 SYRINGE (ML) INJECTION PRN
Status: CANCELLED | OUTPATIENT
Start: 2019-10-08

## 2019-09-24 RX ORDER — HEPARIN SODIUM (PORCINE) LOCK FLUSH IV SOLN 100 UNIT/ML 100 UNIT/ML
500 SOLUTION INTRAVENOUS PRN
Status: CANCELLED | OUTPATIENT
Start: 2019-10-08

## 2019-09-24 RX ORDER — 0.9 % SODIUM CHLORIDE 0.9 %
10 VIAL (ML) INJECTION ONCE
Status: CANCELLED | OUTPATIENT
Start: 2019-10-08

## 2019-09-24 RX ORDER — SODIUM CHLORIDE 9 MG/ML
INJECTION, SOLUTION INTRAVENOUS CONTINUOUS
Status: CANCELLED | OUTPATIENT
Start: 2019-10-08

## 2019-09-24 RX ORDER — ACETAMINOPHEN 325 MG/1
650 TABLET ORAL ONCE
Status: CANCELLED | OUTPATIENT
Start: 2019-10-08

## 2019-09-24 RX ORDER — EPINEPHRINE 1 MG/ML
0.3 INJECTION, SOLUTION, CONCENTRATE INTRAVENOUS PRN
Status: CANCELLED | OUTPATIENT
Start: 2019-10-08

## 2019-09-24 RX ORDER — SODIUM CHLORIDE 0.9 % (FLUSH) 0.9 %
10 SYRINGE (ML) INJECTION PRN
Status: CANCELLED | OUTPATIENT
Start: 2019-10-08

## 2019-09-24 RX ORDER — DIPHENHYDRAMINE HYDROCHLORIDE 50 MG/ML
50 INJECTION INTRAMUSCULAR; INTRAVENOUS ONCE
Status: CANCELLED | OUTPATIENT
Start: 2019-10-08

## 2019-09-24 RX ADMIN — OMALIZUMAB 300 MG: 150 INJECTION, SOLUTION SUBCUTANEOUS at 09:47

## 2019-09-24 NOTE — PROGRESS NOTES
[facility] Xolair Allergy Control Test    Prescribing Physician:    Was patient administered Xolair on appointed administration date? [x] yes [] no    Reason for not administering Xolair :[] Illness [] No show [] Other:    Was there any reaction to mediation? [] yes [x] no    If Yes: Reactions:      1. In the past 4 weeks, how much of the time did your asthma keep you from getting as much done as usual at work, school or at home? [] 1 [] 2 [] 3 [] 4 [x] 5   Score:___5___    2. During the past 4 weeks, how often have you had shortness of breath? [] 1 [x] 2 [] 3 [] 4 [] 5   Score:____2__    3. During the past four weeks, how often did your asthma symptoms (wheezing, coughing, shortness of breath, chest tightness, or pain) wake you up at night or earlier than usual in the morning? [] 1 [] 2 [x] 3 [] 4 [] 5   Score:__3____    4. During the past four weeks, how often have you used your rescue inhaler or nebulizer medication? [] 1 [] 2 [] 3 [] 4 [x] 5   Score:___5___    5. How would you rate your asthma control during the past 4 weeks? [] 1 [] 2 [x] 3 [] 4 [] 5   Score:__3____        Total Score:________18________        To score the Asthma control test: Each response to the 5 ACT questions has a point value from 1-5 as shown on the form. To score the ACT, add up the point value for each response to the 5 questions.

## 2019-10-08 ENCOUNTER — HOSPITAL ENCOUNTER (OUTPATIENT)
Dept: INFUSION THERAPY | Age: 64
Setting detail: INFUSION SERIES
Discharge: HOME | End: 2019-10-08
Payer: MEDICARE

## 2019-10-08 VITALS
TEMPERATURE: 97.6 F | DIASTOLIC BLOOD PRESSURE: 62 MMHG | OXYGEN SATURATION: 94 % | SYSTOLIC BLOOD PRESSURE: 108 MMHG | RESPIRATION RATE: 18 BRPM | HEART RATE: 62 BPM

## 2019-10-08 DIAGNOSIS — J82.83 EOSINOPHILIC ASTHMA: Primary | ICD-10-CM

## 2019-10-08 PROCEDURE — 6360000002 HC RX W HCPCS: Performed by: INTERNAL MEDICINE

## 2019-10-08 PROCEDURE — 96372 THER/PROPH/DIAG INJ SC/IM: CPT

## 2019-10-08 RX ORDER — DIPHENHYDRAMINE HYDROCHLORIDE 50 MG/ML
50 INJECTION INTRAMUSCULAR; INTRAVENOUS ONCE
Status: CANCELLED | OUTPATIENT
Start: 2019-10-22

## 2019-10-08 RX ORDER — SODIUM CHLORIDE 0.9 % (FLUSH) 0.9 %
10 SYRINGE (ML) INJECTION PRN
Status: CANCELLED | OUTPATIENT
Start: 2019-10-22

## 2019-10-08 RX ORDER — EPINEPHRINE 1 MG/ML
0.3 INJECTION, SOLUTION, CONCENTRATE INTRAVENOUS PRN
Status: CANCELLED | OUTPATIENT
Start: 2019-10-22

## 2019-10-08 RX ORDER — SODIUM CHLORIDE 0.9 % (FLUSH) 0.9 %
5 SYRINGE (ML) INJECTION PRN
Status: CANCELLED | OUTPATIENT
Start: 2019-10-22

## 2019-10-08 RX ORDER — SODIUM CHLORIDE 9 MG/ML
INJECTION, SOLUTION INTRAVENOUS CONTINUOUS
Status: CANCELLED | OUTPATIENT
Start: 2019-10-22

## 2019-10-08 RX ORDER — ACETAMINOPHEN 325 MG/1
650 TABLET ORAL ONCE
Status: CANCELLED | OUTPATIENT
Start: 2019-10-22

## 2019-10-08 RX ORDER — 0.9 % SODIUM CHLORIDE 0.9 %
10 VIAL (ML) INJECTION ONCE
Status: CANCELLED | OUTPATIENT
Start: 2019-10-22

## 2019-10-08 RX ORDER — HEPARIN SODIUM (PORCINE) LOCK FLUSH IV SOLN 100 UNIT/ML 100 UNIT/ML
500 SOLUTION INTRAVENOUS PRN
Status: CANCELLED | OUTPATIENT
Start: 2019-10-22

## 2019-10-08 RX ORDER — METHYLPREDNISOLONE SODIUM SUCCINATE 125 MG/2ML
125 INJECTION, POWDER, LYOPHILIZED, FOR SOLUTION INTRAMUSCULAR; INTRAVENOUS ONCE
Status: CANCELLED | OUTPATIENT
Start: 2019-10-22

## 2019-10-08 RX ORDER — ACETAMINOPHEN 325 MG/1
650 TABLET ORAL ONCE
Status: DISCONTINUED | OUTPATIENT
Start: 2019-10-08 | End: 2019-10-09 | Stop reason: HOSPADM

## 2019-10-08 RX ADMIN — OMALIZUMAB 300 MG: 150 INJECTION, SOLUTION SUBCUTANEOUS at 09:51

## 2019-10-25 ENCOUNTER — HOSPITAL ENCOUNTER (OUTPATIENT)
Dept: INFUSION THERAPY | Age: 64
Setting detail: INFUSION SERIES
Discharge: HOME | End: 2019-10-25
Payer: MEDICARE

## 2019-10-25 VITALS
DIASTOLIC BLOOD PRESSURE: 72 MMHG | OXYGEN SATURATION: 98 % | HEART RATE: 70 BPM | RESPIRATION RATE: 18 BRPM | TEMPERATURE: 97.6 F | SYSTOLIC BLOOD PRESSURE: 111 MMHG

## 2019-10-25 DIAGNOSIS — J82.83 EOSINOPHILIC ASTHMA: Primary | ICD-10-CM

## 2019-10-25 PROCEDURE — 96372 THER/PROPH/DIAG INJ SC/IM: CPT

## 2019-10-25 PROCEDURE — 6360000002 HC RX W HCPCS: Performed by: INTERNAL MEDICINE

## 2019-10-25 RX ORDER — ACETAMINOPHEN 325 MG/1
650 TABLET ORAL ONCE
Status: CANCELLED | OUTPATIENT
Start: 2019-11-05

## 2019-10-25 RX ORDER — SODIUM CHLORIDE 0.9 % (FLUSH) 0.9 %
5 SYRINGE (ML) INJECTION PRN
Status: CANCELLED | OUTPATIENT
Start: 2019-11-05

## 2019-10-25 RX ORDER — SODIUM CHLORIDE 9 MG/ML
INJECTION, SOLUTION INTRAVENOUS CONTINUOUS
Status: CANCELLED | OUTPATIENT
Start: 2019-11-05

## 2019-10-25 RX ORDER — ACETAMINOPHEN 325 MG/1
650 TABLET ORAL ONCE
Status: DISCONTINUED | OUTPATIENT
Start: 2019-10-25 | End: 2019-10-26 | Stop reason: HOSPADM

## 2019-10-25 RX ORDER — DIPHENHYDRAMINE HYDROCHLORIDE 50 MG/ML
50 INJECTION INTRAMUSCULAR; INTRAVENOUS ONCE
Status: CANCELLED | OUTPATIENT
Start: 2019-11-05

## 2019-10-25 RX ORDER — SODIUM CHLORIDE 0.9 % (FLUSH) 0.9 %
10 SYRINGE (ML) INJECTION PRN
Status: CANCELLED | OUTPATIENT
Start: 2019-11-05

## 2019-10-25 RX ORDER — METHYLPREDNISOLONE SODIUM SUCCINATE 125 MG/2ML
125 INJECTION, POWDER, LYOPHILIZED, FOR SOLUTION INTRAMUSCULAR; INTRAVENOUS ONCE
Status: CANCELLED | OUTPATIENT
Start: 2019-11-05

## 2019-10-25 RX ORDER — EPINEPHRINE 1 MG/ML
0.3 INJECTION, SOLUTION, CONCENTRATE INTRAVENOUS PRN
Status: CANCELLED | OUTPATIENT
Start: 2019-11-05

## 2019-10-25 RX ORDER — 0.9 % SODIUM CHLORIDE 0.9 %
10 VIAL (ML) INJECTION ONCE
Status: CANCELLED | OUTPATIENT
Start: 2019-11-05

## 2019-10-25 RX ORDER — HEPARIN SODIUM (PORCINE) LOCK FLUSH IV SOLN 100 UNIT/ML 100 UNIT/ML
500 SOLUTION INTRAVENOUS PRN
Status: CANCELLED | OUTPATIENT
Start: 2019-11-05

## 2019-10-25 RX ADMIN — OMALIZUMAB 300 MG: 150 INJECTION, SOLUTION SUBCUTANEOUS at 08:35

## 2019-10-25 NOTE — DISCHARGE INSTR - COC
Continuity of Care Form    Patient Name: Oralia Berger   :  1955  MRN:  [id number]    Admit date:  10/25/2019  Discharge date:  ***    Code Status Order: Prior   Advance Directives:     Admitting Physician:  No admitting provider for patient encounter. PCP: Дмитрий Felix MD    Discharging Nurse: [facility] Unit/Room#: No information available for this encounter. Discharging Unit Phone Number: ***    Emergency Contact:   Extended Emergency Contact Information  Primary Emergency Contact: Ora Blaze  Address: [place] [address] Phone: [phone]  Mobile Phone: [phone]  Relation: Other  Preferred language: English   needed? No  Secondary Emergency Contact: Florence Silvestre  Address: [address] [place] [address] Phone: [phone]  Relation: Parent  Preferred language: English   needed? No    Past Surgical History:  Past Surgical History:   Procedure Laterality Date    CATARACT REMOVAL WITH IMPLANT Bilateral     EYE SURGERY      SKIN BIOPSY  2015    VITRECTOMY Left 2017       Immunization History: There is no immunization history on file for this patient.     Active Problems:  Patient Active Problem List   Diagnosis Code    Spondylolisthesis at L4-L5 level M43.16    Moderate asthma V92.560    Eosinophilic asthma ([facility] [address] X10       Isolation/Infection:   Isolation          No Isolation            Nurse Assessment:  Last Vital Signs: /76   Pulse 74   Temp 97.6 °F (36.4 °C) (Temporal)   Resp 18   SpO2 99%     Last documented pain score (0-10 scale):    Last Weight:   Wt Readings from Last 1 Encounters:   19 216 lb (98 kg)     Mental Status:  {IP PT MENTAL STATUS:29348}    IV Access:  [facility] IV ACCESS:616024616}    Nursing Mobility/ADLs:  Walking   {P DME TECD:584958514}  Transfer  {P DME FCRR:549598115}  Bathing  {P DME TBLT:366503784}  Dressing  {CHP DME MMEV:850410342}  Toileting  {CHP DME JNHB:177041148}  Feeding  {CHP DME YCSQ:092329773}  Med Admin  {CHP DME KDIA:353600804}  Med Delivery   { [facility] MED Delivery:330172017}    Wound Care Documentation and Therapy:        Elimination:  Continence:   · Bowel: {YES / EY:25600}  · Bladder: {YES / DY:15932}  Urinary Catheter: {Urinary Catheter:218620204}   Colostomy/Ileostomy/Ileal Conduit: {YES / FQ:02963}       Date of Last BM: ***  No intake or output data in the 24 hours ending 10/25/19 0843  No intake/output data recorded.     Safety Concerns:     [address] [employer] Safety Concerns:590222595}    Impairments/Disabilities:      [employer] Impairments/Disabilities:995339311}    Nutrition Therapy:  Current Nutrition Therapy:   [employer] Diet List:269566355}    Routes of Feeding: {[facility] DME Other Feedings:731905234}  Liquids: {Slp liquid thickness:26528}  Daily Fluid Restriction: {CHP DME Yes amt example:631472634}  Last Modified Barium Swallow with Video (Video Swallowing Test): {Done Not Done QKEB:516398227}    Treatments at the Time of Hospital Discharge:   Respiratory Treatments: ***  Oxygen Therapy:  {Therapy; copd oxygen:51073}  Ventilator:    { CC Vent INBH:842112621}    Rehab Therapies: {THERAPEUTIC INTERVENTION:9395421477}  Weight Bearing Status/Restrictions: [address] [employer] Weight Bearin}  Other Medical Equipment (for information only, NOT a DME order):  {EQUIPMENT:515965923}  Other Treatments: ***    Patient's personal belongings (please select all that are sent with patient):  {[facility] DME Belongings:451737340}    RN SIGNATURE:  {Esignature:407980634}    CASE MANAGEMENT/SOCIAL WORK SECTION    Inpatient Status Date: ***    Readmission Risk Assessment Score:  Readmission Risk              Risk of Unplanned Readmission:        0           Discharging to Facility/ Agency   · Name:   · Address:  · Phone:  · Fax:    Dialysis Facility (if applicable)   · Name:  · Address:  · Dialysis Schedule:  · Phone:  · Fax:    / signature: {Esignature:728303761}    PHYSICIAN SECTION    Prognosis: {Prognosis:3929018135}    Condition at Discharge: 50[facility] Schreiber Patient Condition:680817963}    Rehab Potential (if transferring to Rehab): {Prognosis:5374403727}    Recommended Labs or Other Treatments After Discharge: ***    Physician Certification: I certify the above information and transfer of Sara Baires  is necessary for the continuing treatment of the diagnosis listed and that he requires {Admit to Appropriate Level of Care:29843} for {GREATER/LESS:805206771} 30 days.      Update Admission H&P: {CHP DME Changes in [facility]:053997225}    PHYSICIAN SIGNATURE:  {Esignature:563560332}

## 2019-11-12 ENCOUNTER — HOSPITAL ENCOUNTER (OUTPATIENT)
Dept: INFUSION THERAPY | Age: 64
Setting detail: INFUSION SERIES
Discharge: HOME | End: 2019-11-12
Payer: MEDICARE

## 2019-11-12 VITALS
HEART RATE: 70 BPM | DIASTOLIC BLOOD PRESSURE: 60 MMHG | OXYGEN SATURATION: 98 % | RESPIRATION RATE: 18 BRPM | SYSTOLIC BLOOD PRESSURE: 128 MMHG | TEMPERATURE: 97.4 F

## 2019-11-12 DIAGNOSIS — J82.83 EOSINOPHILIC ASTHMA: Primary | ICD-10-CM

## 2019-11-12 PROCEDURE — 6360000002 HC RX W HCPCS: Performed by: INTERNAL MEDICINE

## 2019-11-12 PROCEDURE — 96372 THER/PROPH/DIAG INJ SC/IM: CPT

## 2019-11-12 RX ORDER — HEPARIN SODIUM (PORCINE) LOCK FLUSH IV SOLN 100 UNIT/ML 100 UNIT/ML
500 SOLUTION INTRAVENOUS PRN
Status: CANCELLED | OUTPATIENT
Start: 2019-11-19

## 2019-11-12 RX ORDER — ACETAMINOPHEN 325 MG/1
650 TABLET ORAL ONCE
Status: CANCELLED | OUTPATIENT
Start: 2019-11-19

## 2019-11-12 RX ORDER — SODIUM CHLORIDE 0.9 % (FLUSH) 0.9 %
5 SYRINGE (ML) INJECTION PRN
Status: CANCELLED | OUTPATIENT
Start: 2019-11-19

## 2019-11-12 RX ORDER — EPINEPHRINE 1 MG/ML
0.3 INJECTION, SOLUTION, CONCENTRATE INTRAVENOUS PRN
Status: CANCELLED | OUTPATIENT
Start: 2019-11-19

## 2019-11-12 RX ORDER — ACETAMINOPHEN 325 MG/1
650 TABLET ORAL ONCE
Status: DISCONTINUED | OUTPATIENT
Start: 2019-11-12 | End: 2019-11-13 | Stop reason: HOSPADM

## 2019-11-12 RX ORDER — SODIUM CHLORIDE 0.9 % (FLUSH) 0.9 %
10 SYRINGE (ML) INJECTION PRN
Status: CANCELLED | OUTPATIENT
Start: 2019-11-19

## 2019-11-12 RX ORDER — DIPHENHYDRAMINE HYDROCHLORIDE 50 MG/ML
50 INJECTION INTRAMUSCULAR; INTRAVENOUS ONCE
Status: CANCELLED | OUTPATIENT
Start: 2019-11-19

## 2019-11-12 RX ORDER — METHYLPREDNISOLONE SODIUM SUCCINATE 125 MG/2ML
125 INJECTION, POWDER, LYOPHILIZED, FOR SOLUTION INTRAMUSCULAR; INTRAVENOUS ONCE
Status: CANCELLED | OUTPATIENT
Start: 2019-11-19

## 2019-11-12 RX ORDER — 0.9 % SODIUM CHLORIDE 0.9 %
10 VIAL (ML) INJECTION ONCE
Status: CANCELLED | OUTPATIENT
Start: 2019-11-19

## 2019-11-12 RX ORDER — SODIUM CHLORIDE 9 MG/ML
INJECTION, SOLUTION INTRAVENOUS CONTINUOUS
Status: CANCELLED | OUTPATIENT
Start: 2019-11-19

## 2019-11-12 RX ADMIN — OMALIZUMAB 300 MG: 150 INJECTION, SOLUTION SUBCUTANEOUS at 13:03

## 2019-11-26 ENCOUNTER — HOSPITAL ENCOUNTER (OUTPATIENT)
Dept: INFUSION THERAPY | Age: 64
Setting detail: INFUSION SERIES
Discharge: HOME | End: 2019-11-26
Payer: MEDICARE

## 2019-11-26 VITALS
SYSTOLIC BLOOD PRESSURE: 109 MMHG | HEART RATE: 61 BPM | DIASTOLIC BLOOD PRESSURE: 69 MMHG | OXYGEN SATURATION: 97 % | RESPIRATION RATE: 18 BRPM | TEMPERATURE: 97.6 F

## 2019-11-26 DIAGNOSIS — J82.83 EOSINOPHILIC ASTHMA: Primary | ICD-10-CM

## 2019-11-26 PROCEDURE — 6360000002 HC RX W HCPCS: Performed by: INTERNAL MEDICINE

## 2019-11-26 PROCEDURE — 6370000000 HC RX 637 (ALT 250 FOR IP): Performed by: INTERNAL MEDICINE

## 2019-11-26 PROCEDURE — 96372 THER/PROPH/DIAG INJ SC/IM: CPT

## 2019-11-26 RX ORDER — ACETAMINOPHEN 325 MG/1
650 TABLET ORAL ONCE
Status: COMPLETED | OUTPATIENT
Start: 2019-11-26 | End: 2019-11-26

## 2019-11-26 RX ORDER — SODIUM CHLORIDE 0.9 % (FLUSH) 0.9 %
5 SYRINGE (ML) INJECTION PRN
Status: CANCELLED | OUTPATIENT
Start: 2019-12-10

## 2019-11-26 RX ORDER — METHYLPREDNISOLONE SODIUM SUCCINATE 125 MG/2ML
125 INJECTION, POWDER, LYOPHILIZED, FOR SOLUTION INTRAMUSCULAR; INTRAVENOUS ONCE
Status: CANCELLED | OUTPATIENT
Start: 2019-12-10

## 2019-11-26 RX ORDER — DIPHENHYDRAMINE HYDROCHLORIDE 50 MG/ML
50 INJECTION INTRAMUSCULAR; INTRAVENOUS ONCE
Status: CANCELLED | OUTPATIENT
Start: 2019-12-10

## 2019-11-26 RX ORDER — EPINEPHRINE 1 MG/ML
0.3 INJECTION, SOLUTION, CONCENTRATE INTRAVENOUS PRN
Status: CANCELLED | OUTPATIENT
Start: 2019-12-10

## 2019-11-26 RX ORDER — SODIUM CHLORIDE 0.9 % (FLUSH) 0.9 %
10 SYRINGE (ML) INJECTION PRN
Status: CANCELLED | OUTPATIENT
Start: 2019-12-10

## 2019-11-26 RX ORDER — ACETAMINOPHEN 325 MG/1
650 TABLET ORAL ONCE
Status: CANCELLED | OUTPATIENT
Start: 2019-12-10

## 2019-11-26 RX ORDER — HEPARIN SODIUM (PORCINE) LOCK FLUSH IV SOLN 100 UNIT/ML 100 UNIT/ML
500 SOLUTION INTRAVENOUS PRN
Status: CANCELLED | OUTPATIENT
Start: 2019-12-10

## 2019-11-26 RX ORDER — 0.9 % SODIUM CHLORIDE 0.9 %
10 VIAL (ML) INJECTION ONCE
Status: CANCELLED | OUTPATIENT
Start: 2019-12-10

## 2019-11-26 RX ORDER — SODIUM CHLORIDE 9 MG/ML
INJECTION, SOLUTION INTRAVENOUS CONTINUOUS
Status: CANCELLED | OUTPATIENT
Start: 2019-12-10

## 2019-11-26 RX ADMIN — OMALIZUMAB 300 MG: 150 INJECTION, SOLUTION SUBCUTANEOUS at 09:49

## 2019-11-26 RX ADMIN — ACETAMINOPHEN 650 MG: 325 TABLET, FILM COATED ORAL at 09:41

## 2019-11-26 NOTE — PROGRESS NOTES
[facility] Xolair Allergy Control Test    Prescribing Physician:    Was patient administered Xolair on appointed administration date? [x] yes [] no    Reason for not administering Xolair :[] Illness [] No show [] Other:    Was there any reaction to mediation? [] yes [x] no    If Yes: Reactions:      1. In the past 4 weeks, how much of the time did your asthma keep you from getting as much done as usual at work, school or at home? [] 1 [] 2 [] 3 [] 4 [x] 5   Score:__5____    2. During the past 4 weeks, how often have you had shortness of breath? 44[] 1 [] 2 [] 3 [x] 4 [] 5   Score:_4_____    3. During the past four weeks, how often did your asthma symptoms (wheezing, coughing, shortness of breath, chest tightness, or pain) wake you up at night or earlier than usual in the morning? [] 1 [] 2 [] 3 [x] 4 [] 5   Score:___4___    4. During the past four weeks, how often have you used your rescue inhaler or nebulizer medication? [] 1 [] 2 [] 3 [] 4 [] 5   Score:___5___    5. How would you rate your asthma control during the past 4 weeks? 3  [] 1 [] 2 [x] 3 [] 4 [] 5   Score:_____3_        Total Score:______21__________        To score the Asthma control test: Each response to the 5 ACT questions has a point value from 1-5 as shown on the form. To score the ACT, add up the point value for each response to the 5 questions.

## 2019-12-10 ENCOUNTER — HOSPITAL ENCOUNTER (OUTPATIENT)
Dept: INFUSION THERAPY | Age: 64
Setting detail: INFUSION SERIES
Discharge: HOME | End: 2019-12-10
Payer: MEDICARE

## 2019-12-10 VITALS
DIASTOLIC BLOOD PRESSURE: 57 MMHG | TEMPERATURE: 97.8 F | HEART RATE: 68 BPM | RESPIRATION RATE: 18 BRPM | SYSTOLIC BLOOD PRESSURE: 96 MMHG | OXYGEN SATURATION: 97 %

## 2019-12-10 DIAGNOSIS — J82.83 EOSINOPHILIC ASTHMA: Primary | ICD-10-CM

## 2019-12-10 DIAGNOSIS — J45.40 MODERATE PERSISTENT ASTHMA, UNSPECIFIED WHETHER COMPLICATED: Primary | ICD-10-CM

## 2019-12-10 PROCEDURE — 96372 THER/PROPH/DIAG INJ SC/IM: CPT

## 2019-12-10 PROCEDURE — 6360000002 HC RX W HCPCS: Performed by: INTERNAL MEDICINE

## 2019-12-10 RX ORDER — ACETAMINOPHEN 325 MG/1
650 TABLET ORAL ONCE
Status: CANCELLED | OUTPATIENT
Start: 2019-12-24

## 2019-12-10 RX ORDER — ACETAMINOPHEN 325 MG/1
650 TABLET ORAL ONCE
Status: DISCONTINUED | OUTPATIENT
Start: 2019-12-10 | End: 2019-12-11 | Stop reason: HOSPADM

## 2019-12-10 RX ORDER — HEPARIN SODIUM (PORCINE) LOCK FLUSH IV SOLN 100 UNIT/ML 100 UNIT/ML
500 SOLUTION INTRAVENOUS PRN
Status: CANCELLED | OUTPATIENT
Start: 2019-12-24

## 2019-12-10 RX ORDER — EPINEPHRINE 1 MG/ML
0.3 INJECTION, SOLUTION, CONCENTRATE INTRAVENOUS PRN
Status: CANCELLED | OUTPATIENT
Start: 2019-12-24

## 2019-12-10 RX ORDER — SODIUM CHLORIDE 9 MG/ML
INJECTION, SOLUTION INTRAVENOUS CONTINUOUS
Status: CANCELLED | OUTPATIENT
Start: 2019-12-24

## 2019-12-10 RX ORDER — SODIUM CHLORIDE 0.9 % (FLUSH) 0.9 %
10 SYRINGE (ML) INJECTION PRN
Status: CANCELLED | OUTPATIENT
Start: 2019-12-24

## 2019-12-10 RX ORDER — METHYLPREDNISOLONE SODIUM SUCCINATE 125 MG/2ML
125 INJECTION, POWDER, LYOPHILIZED, FOR SOLUTION INTRAMUSCULAR; INTRAVENOUS ONCE
Status: CANCELLED | OUTPATIENT
Start: 2019-12-24

## 2019-12-10 RX ORDER — SODIUM CHLORIDE 0.9 % (FLUSH) 0.9 %
5 SYRINGE (ML) INJECTION PRN
Status: CANCELLED | OUTPATIENT
Start: 2019-12-24

## 2019-12-10 RX ORDER — DIPHENHYDRAMINE HYDROCHLORIDE 50 MG/ML
50 INJECTION INTRAMUSCULAR; INTRAVENOUS ONCE
Status: CANCELLED | OUTPATIENT
Start: 2019-12-24

## 2019-12-10 RX ORDER — 0.9 % SODIUM CHLORIDE 0.9 %
10 VIAL (ML) INJECTION ONCE
Status: CANCELLED | OUTPATIENT
Start: 2019-12-24

## 2019-12-10 RX ADMIN — OMALIZUMAB 300 MG: 150 INJECTION, SOLUTION SUBCUTANEOUS at 09:51

## 2019-12-20 DIAGNOSIS — J45.909 REACTIVE AIRWAY DISEASE WITHOUT COMPLICATION, UNSPECIFIED ASTHMA SEVERITY, UNSPECIFIED WHETHER PERSISTENT: ICD-10-CM

## 2019-12-20 RX ORDER — FLUTICASONE FUROATE AND VILANTEROL 100; 25 UG/1; UG/1
1 POWDER RESPIRATORY (INHALATION) DAILY
Qty: 1 EACH | Refills: 6 | Status: SHIPPED | OUTPATIENT
Start: 2019-12-20 | End: 2020-01-08 | Stop reason: SDUPTHER

## 2019-12-27 ENCOUNTER — HOSPITAL ENCOUNTER (OUTPATIENT)
Dept: INFUSION THERAPY | Age: 64
Setting detail: INFUSION SERIES
Discharge: HOME | End: 2019-12-27
Payer: MEDICARE

## 2019-12-27 VITALS
SYSTOLIC BLOOD PRESSURE: 110 MMHG | TEMPERATURE: 97.2 F | OXYGEN SATURATION: 97 % | DIASTOLIC BLOOD PRESSURE: 60 MMHG | HEART RATE: 70 BPM | RESPIRATION RATE: 18 BRPM

## 2019-12-27 DIAGNOSIS — J82.83 EOSINOPHILIC ASTHMA: Primary | ICD-10-CM

## 2019-12-27 PROCEDURE — 96372 THER/PROPH/DIAG INJ SC/IM: CPT

## 2019-12-27 PROCEDURE — 6360000002 HC RX W HCPCS: Performed by: INTERNAL MEDICINE

## 2019-12-27 RX ORDER — 0.9 % SODIUM CHLORIDE 0.9 %
10 VIAL (ML) INJECTION ONCE
Status: CANCELLED | OUTPATIENT
Start: 2020-01-07

## 2019-12-27 RX ORDER — ACETAMINOPHEN 325 MG/1
650 TABLET ORAL ONCE
Status: DISCONTINUED | OUTPATIENT
Start: 2019-12-27 | End: 2019-12-28 | Stop reason: HOSPADM

## 2019-12-27 RX ORDER — METHYLPREDNISOLONE SODIUM SUCCINATE 125 MG/2ML
125 INJECTION, POWDER, LYOPHILIZED, FOR SOLUTION INTRAMUSCULAR; INTRAVENOUS ONCE
Status: CANCELLED | OUTPATIENT
Start: 2020-01-07

## 2019-12-27 RX ORDER — DIPHENHYDRAMINE HYDROCHLORIDE 50 MG/ML
50 INJECTION INTRAMUSCULAR; INTRAVENOUS ONCE
Status: CANCELLED | OUTPATIENT
Start: 2020-01-07

## 2019-12-27 RX ORDER — HEPARIN SODIUM (PORCINE) LOCK FLUSH IV SOLN 100 UNIT/ML 100 UNIT/ML
500 SOLUTION INTRAVENOUS PRN
Status: CANCELLED | OUTPATIENT
Start: 2020-01-07

## 2019-12-27 RX ORDER — ACETAMINOPHEN 325 MG/1
650 TABLET ORAL ONCE
Status: CANCELLED | OUTPATIENT
Start: 2020-01-07

## 2019-12-27 RX ORDER — SODIUM CHLORIDE 0.9 % (FLUSH) 0.9 %
5 SYRINGE (ML) INJECTION PRN
Status: CANCELLED | OUTPATIENT
Start: 2020-01-07

## 2019-12-27 RX ORDER — SODIUM CHLORIDE 0.9 % (FLUSH) 0.9 %
10 SYRINGE (ML) INJECTION PRN
Status: CANCELLED | OUTPATIENT
Start: 2020-01-07

## 2019-12-27 RX ORDER — EPINEPHRINE 1 MG/ML
0.3 INJECTION, SOLUTION, CONCENTRATE INTRAVENOUS PRN
Status: CANCELLED | OUTPATIENT
Start: 2020-01-07

## 2019-12-27 RX ORDER — SODIUM CHLORIDE 9 MG/ML
INJECTION, SOLUTION INTRAVENOUS CONTINUOUS
Status: CANCELLED | OUTPATIENT
Start: 2020-01-07

## 2019-12-27 RX ADMIN — OMALIZUMAB 300 MG: 150 INJECTION, SOLUTION SUBCUTANEOUS at 09:14

## 2020-01-07 ENCOUNTER — HOSPITAL ENCOUNTER (OUTPATIENT)
Dept: INFUSION THERAPY | Age: 65
Setting detail: INFUSION SERIES
Discharge: HOME | End: 2020-01-07
Payer: MEDICARE

## 2020-01-07 VITALS
SYSTOLIC BLOOD PRESSURE: 129 MMHG | RESPIRATION RATE: 18 BRPM | HEART RATE: 81 BPM | TEMPERATURE: 97.6 F | OXYGEN SATURATION: 97 % | DIASTOLIC BLOOD PRESSURE: 55 MMHG

## 2020-01-07 DIAGNOSIS — J82.83 EOSINOPHILIC ASTHMA: Primary | ICD-10-CM

## 2020-01-07 PROCEDURE — 6360000002 HC RX W HCPCS: Performed by: INTERNAL MEDICINE

## 2020-01-07 PROCEDURE — 96372 THER/PROPH/DIAG INJ SC/IM: CPT

## 2020-01-07 RX ORDER — ACETAMINOPHEN 325 MG/1
650 TABLET ORAL ONCE
Status: DISCONTINUED | OUTPATIENT
Start: 2020-01-07 | End: 2020-01-08 | Stop reason: HOSPADM

## 2020-01-07 RX ORDER — DIPHENHYDRAMINE HYDROCHLORIDE 50 MG/ML
50 INJECTION INTRAMUSCULAR; INTRAVENOUS ONCE
Status: CANCELLED | OUTPATIENT
Start: 2020-01-21

## 2020-01-07 RX ORDER — 0.9 % SODIUM CHLORIDE 0.9 %
10 VIAL (ML) INJECTION ONCE
Status: CANCELLED | OUTPATIENT
Start: 2020-01-21

## 2020-01-07 RX ORDER — ACETAMINOPHEN 325 MG/1
650 TABLET ORAL ONCE
Status: CANCELLED | OUTPATIENT
Start: 2020-01-21

## 2020-01-07 RX ORDER — HEPARIN SODIUM (PORCINE) LOCK FLUSH IV SOLN 100 UNIT/ML 100 UNIT/ML
500 SOLUTION INTRAVENOUS PRN
Status: CANCELLED | OUTPATIENT
Start: 2020-01-21

## 2020-01-07 RX ORDER — SODIUM CHLORIDE 0.9 % (FLUSH) 0.9 %
5 SYRINGE (ML) INJECTION PRN
Status: CANCELLED | OUTPATIENT
Start: 2020-01-21

## 2020-01-07 RX ORDER — SODIUM CHLORIDE 0.9 % (FLUSH) 0.9 %
10 SYRINGE (ML) INJECTION PRN
Status: CANCELLED | OUTPATIENT
Start: 2020-01-21

## 2020-01-07 RX ORDER — SODIUM CHLORIDE 9 MG/ML
INJECTION, SOLUTION INTRAVENOUS CONTINUOUS
Status: CANCELLED | OUTPATIENT
Start: 2020-01-21

## 2020-01-07 RX ORDER — EPINEPHRINE 1 MG/ML
0.3 INJECTION, SOLUTION, CONCENTRATE INTRAVENOUS PRN
Status: CANCELLED | OUTPATIENT
Start: 2020-01-21

## 2020-01-07 RX ORDER — METHYLPREDNISOLONE SODIUM SUCCINATE 125 MG/2ML
125 INJECTION, POWDER, LYOPHILIZED, FOR SOLUTION INTRAMUSCULAR; INTRAVENOUS ONCE
Status: CANCELLED | OUTPATIENT
Start: 2020-01-21

## 2020-01-07 RX ADMIN — OMALIZUMAB 300 MG: 150 INJECTION, SOLUTION SUBCUTANEOUS at 08:27

## 2020-01-08 NOTE — PROGRESS NOTES
Spoke with pt about Recardo Breeding being taken off formulary for his insurance. Substitution made with Dulera inhaler; advised pt on use and to contact [facility] [employer] later today to check on cost of inhaler. Advised to notify us of any issues.

## 2020-01-24 ENCOUNTER — HOSPITAL ENCOUNTER (OUTPATIENT)
Dept: INFUSION THERAPY | Age: 65
Setting detail: INFUSION SERIES
End: 2020-01-24
Payer: MEDICARE

## 2020-03-11 ENCOUNTER — TELEPHONE (OUTPATIENT)
Dept: PULMONOLOGY | Age: 65
End: 2020-03-11

## 2021-02-16 ENCOUNTER — HOSPITAL ENCOUNTER (OUTPATIENT)
Age: 66
Discharge: HOME | End: 2021-02-18
Payer: MEDICARE

## 2021-02-16 ENCOUNTER — HOSPITAL ENCOUNTER (OUTPATIENT)
Age: 66
Discharge: HOME | End: 2021-02-18

## 2021-02-16 LAB
ABO/RH: NORMAL
ANTIBODY SCREEN: NORMAL

## 2021-02-16 PROCEDURE — 86850 RBC ANTIBODY SCREEN: CPT

## 2021-02-16 PROCEDURE — 87081 CULTURE SCREEN ONLY: CPT

## 2021-02-16 PROCEDURE — 86901 BLOOD TYPING SEROLOGIC RH(D): CPT

## 2021-02-16 PROCEDURE — 86900 BLOOD TYPING SEROLOGIC ABO: CPT

## 2021-02-18 LAB — MRSA CULTURE ONLY: NORMAL

## 2021-02-23 ENCOUNTER — HOSPITAL ENCOUNTER (OUTPATIENT)
Age: 66
Discharge: HOME | End: 2021-02-25

## 2021-02-23 LAB
ANION GAP SERPL CALCULATED.3IONS-SCNC: 9 MMOL/L (ref 7–16)
BUN BLDV-MCNC: 23 MG/DL (ref 8–23)
CALCIUM SERPL-MCNC: 8.6 MG/DL (ref 8.6–10.2)
CHLORIDE BLD-SCNC: 102 MMOL/L (ref 98–107)
CO2: 26 MMOL/L (ref 22–29)
CREAT SERPL-MCNC: 0.9 MG/DL (ref 0.7–1.2)
GFR AFRICAN AMERICAN: >60
GFR NON-AFRICAN AMERICAN: >60 ML/MIN/1.73
GLUCOSE BLD-MCNC: 135 MG/DL (ref 74–99)
HCT VFR BLD CALC: 40.4 % (ref 37–54)
HEMOGLOBIN: 13.5 G/DL (ref 12.5–16.5)
MCH RBC QN AUTO: 31.3 PG (ref 26–35)
MCHC RBC AUTO-ENTMCNC: 33.4 % (ref 32–34.5)
MCV RBC AUTO: 93.5 FL (ref 80–99.9)
PDW BLD-RTO: 12.6 FL (ref 11.5–15)
PLATELET # BLD: 288 E9/L (ref 130–450)
PMV BLD AUTO: 10.5 FL (ref 7–12)
POTASSIUM SERPL-SCNC: 3.9 MMOL/L (ref 3.5–5)
RBC # BLD: 4.32 E12/L (ref 3.8–5.8)
SODIUM BLD-SCNC: 137 MMOL/L (ref 132–146)
WBC # BLD: 17 E9/L (ref 4.5–11.5)

## 2021-02-23 PROCEDURE — 80048 BASIC METABOLIC PNL TOTAL CA: CPT

## 2021-02-23 PROCEDURE — 85027 COMPLETE CBC AUTOMATED: CPT

## 2022-03-11 ENCOUNTER — HOSPITAL ENCOUNTER (OUTPATIENT)
Age: 67
Discharge: HOME | End: 2022-03-13

## 2022-03-11 PROCEDURE — 87070 CULTURE OTHR SPECIMN AEROBIC: CPT

## 2022-03-11 PROCEDURE — 87102 FUNGUS ISOLATION CULTURE: CPT

## 2022-03-11 PROCEDURE — 87206 SMEAR FLUORESCENT/ACID STAI: CPT

## 2022-03-11 PROCEDURE — 87205 SMEAR GRAM STAIN: CPT

## 2022-03-11 PROCEDURE — 87075 CULTR BACTERIA EXCEPT BLOOD: CPT

## 2022-03-11 PROCEDURE — 89051 BODY FLUID CELL COUNT: CPT

## 2022-03-11 PROCEDURE — 87015 SPECIMEN INFECT AGNT CONCNTJ: CPT

## 2022-03-11 PROCEDURE — 87116 MYCOBACTERIA CULTURE: CPT

## 2022-03-12 LAB
APPEARANCE FLUID: ABNORMAL
CELL COUNT FLUID TYPE: ABNORMAL
COLOR FLUID: ABNORMAL
EOSINOPHIL FLUID: 4 %
GRAM STAIN ORDERABLE: NORMAL
LYMPHOCYTES, BODY FLUID: 29 %
MONOCYTE, FLUID: 13 %
NEUTROPHIL, FLUID: 54 %
NUCLEATED CELLS FLUID: ABNORMAL /UL
RBC FLUID: ABNORMAL /UL

## 2022-03-16 LAB
BODY FLUID CULTURE, STERILE: NORMAL
GRAM STAIN RESULT: NORMAL

## 2022-03-24 LAB — ANAEROBIC CULTURE: NORMAL

## 2022-04-18 LAB
FUNGUS (MYCOLOGY) CULTURE: NORMAL
FUNGUS STAIN: NORMAL

## 2022-05-03 LAB
AFB CULTURE (MYCOBACTERIA): NORMAL
AFB SMEAR: NORMAL

## 2024-09-10 ENCOUNTER — HOSPITAL ENCOUNTER (OUTPATIENT)
Age: 69
Discharge: HOME | End: 2024-09-12

## 2024-09-10 PROCEDURE — 87081 CULTURE SCREEN ONLY: CPT

## 2024-09-12 LAB
MICROORGANISM SPEC CULT: NORMAL
SPECIMEN DESCRIPTION: NORMAL

## 2024-09-18 ENCOUNTER — HOSPITAL ENCOUNTER (OUTPATIENT)
Age: 69
Discharge: HOME | End: 2024-09-20

## 2024-09-18 LAB
ABO + RH BLD: NORMAL
ARM BAND NUMBER: NORMAL
BLOOD BANK SAMPLE EXPIRATION: NORMAL
BLOOD GROUP ANTIBODIES SERPL: NEGATIVE

## 2024-09-18 PROCEDURE — 86850 RBC ANTIBODY SCREEN: CPT

## 2024-09-18 PROCEDURE — 86901 BLOOD TYPING SEROLOGIC RH(D): CPT

## 2024-09-18 PROCEDURE — 86900 BLOOD TYPING SEROLOGIC ABO: CPT

## 2024-09-19 ENCOUNTER — HOSPITAL ENCOUNTER (OUTPATIENT)
Age: 69
Discharge: HOME | End: 2024-09-21

## 2024-09-19 LAB
ANION GAP SERPL CALCULATED.3IONS-SCNC: 12 MMOL/L (ref 7–16)
BUN SERPL-MCNC: 22 MG/DL (ref 6–23)
CALCIUM SERPL-MCNC: 8.7 MG/DL (ref 8.6–10.2)
CHLORIDE SERPL-SCNC: 103 MMOL/L (ref 98–107)
CO2 SERPL-SCNC: 23 MMOL/L (ref 22–29)
CREAT SERPL-MCNC: 0.9 MG/DL (ref 0.7–1.2)
ERYTHROCYTE [DISTWIDTH] IN BLOOD BY AUTOMATED COUNT: 12.4 % (ref 11.5–15)
GFR, ESTIMATED: >90 ML/MIN/1.73M2
GLUCOSE SERPL-MCNC: 124 MG/DL (ref 74–99)
HCT VFR BLD AUTO: 40.6 % (ref 37–54)
HGB BLD-MCNC: 13.8 G/DL (ref 12.5–16.5)
MCH RBC QN AUTO: 31.4 PG (ref 26–35)
MCHC RBC AUTO-ENTMCNC: 34 G/DL (ref 32–34.5)
MCV RBC AUTO: 92.5 FL (ref 80–99.9)
PLATELET # BLD AUTO: 305 K/UL (ref 130–450)
PMV BLD AUTO: 10.1 FL (ref 7–12)
POTASSIUM SERPL-SCNC: 4.2 MMOL/L (ref 3.5–5)
RBC # BLD AUTO: 4.39 M/UL (ref 3.8–5.8)
SODIUM SERPL-SCNC: 138 MMOL/L (ref 132–146)
WBC OTHER # BLD: 14.3 K/UL (ref 4.5–11.5)

## 2024-09-19 PROCEDURE — 85027 COMPLETE CBC AUTOMATED: CPT

## 2024-09-19 PROCEDURE — 36415 COLL VENOUS BLD VENIPUNCTURE: CPT

## 2024-09-19 PROCEDURE — 80048 BASIC METABOLIC PNL TOTAL CA: CPT

## 2025-03-04 ENCOUNTER — HOSPITAL ENCOUNTER (OUTPATIENT)
Dept: CT IMAGING | Age: 70
Discharge: HOME | End: 2025-03-06
Payer: COMMERCIAL

## 2025-03-04 DIAGNOSIS — R94.2 ABNORMAL FLOW VOLUME LOOP: ICD-10-CM

## 2025-03-04 PROCEDURE — 70490 CT SOFT TISSUE NECK W/O DYE: CPT
